# Patient Record
Sex: MALE | Race: WHITE | Employment: OTHER | URBAN - METROPOLITAN AREA
[De-identification: names, ages, dates, MRNs, and addresses within clinical notes are randomized per-mention and may not be internally consistent; named-entity substitution may affect disease eponyms.]

---

## 2017-01-06 PROBLEM — R01.1 MURMUR: Status: ACTIVE | Noted: 2017-01-06

## 2017-01-06 PROBLEM — R94.31 ABNORMAL EKG: Status: ACTIVE | Noted: 2017-01-06

## 2017-01-23 RX ORDER — VERAPAMIL HYDROCHLORIDE 240 MG/1
CAPSULE, EXTENDED RELEASE ORAL
Qty: 90 CAPSULE | Refills: 0 | Status: SHIPPED | OUTPATIENT
Start: 2017-01-23 | End: 2017-01-26

## 2017-01-24 ENCOUNTER — HOSPITAL ENCOUNTER (OUTPATIENT)
Dept: CV DIAGNOSTICS | Age: 82
Discharge: HOME | End: 2017-01-24
Attending: INTERNAL MEDICINE
Payer: MEDICARE

## 2017-01-24 DIAGNOSIS — R94.31 ABNORMAL EKG: ICD-10-CM

## 2017-01-24 DIAGNOSIS — R01.1 MURMUR: ICD-10-CM

## 2017-01-24 PROCEDURE — 93306 TTE W/DOPPLER COMPLETE: CPT | Performed by: INTERNAL MEDICINE

## 2017-01-25 ENCOUNTER — PATIENT MESSAGE (OUTPATIENT)
Dept: INTERNAL MEDICINE CLINIC | Facility: CLINIC | Age: 82
End: 2017-01-25

## 2017-01-26 ENCOUNTER — HOSPITAL ENCOUNTER (OUTPATIENT)
Dept: CV DIAGNOSTICS | Facility: HOSPITAL | Age: 82
Discharge: HOME | End: 2017-01-26
Attending: INTERNAL MEDICINE
Payer: MEDICARE

## 2017-01-26 DIAGNOSIS — R94.31 ABNORMAL EKG: ICD-10-CM

## 2017-01-26 PROCEDURE — 93017 CV STRESS TEST TRACING ONLY: CPT

## 2017-01-26 PROCEDURE — 78452 HT MUSCLE IMAGE SPECT MULT: CPT | Performed by: INTERNAL MEDICINE

## 2017-01-26 PROCEDURE — 93018 CV STRESS TEST I&R ONLY: CPT | Performed by: INTERNAL MEDICINE

## 2017-01-26 PROCEDURE — 78452 HT MUSCLE IMAGE SPECT MULT: CPT

## 2017-01-26 RX ORDER — VERAPAMIL HYDROCHLORIDE 240 MG/1
TABLET, FILM COATED, EXTENDED RELEASE ORAL
Qty: 90 TABLET | Refills: 1 | Status: SHIPPED | OUTPATIENT
Start: 2017-01-26 | End: 2017-07-07

## 2017-01-26 NOTE — TELEPHONE ENCOUNTER
Noted below. Pt asking if Verapamil  mg SR could be changed to Verapamil  mg ER, due to insurance requirements. Please advise.

## 2017-01-26 NOTE — TELEPHONE ENCOUNTER
From: Sol Julian  To: Emmett Berrios DO  Sent: 1/25/2017 10:35 PM CST  Subject: Prescription Question    My insurance company will not cover the Vearapamil 240MgSr caps on my prescription.  I called the insurance company and the said they WILL cover\" [place]

## 2017-01-27 DIAGNOSIS — E78.00 HYPERCHOLESTEROLEMIA: ICD-10-CM

## 2017-01-27 DIAGNOSIS — I10 ESSENTIAL HYPERTENSION: Primary | ICD-10-CM

## 2017-01-27 RX ORDER — PRAVASTATIN SODIUM 40 MG
40 TABLET ORAL NIGHTLY
Qty: 90 TABLET | Refills: 0 | Status: SHIPPED | OUTPATIENT
Start: 2017-01-27 | End: 2017-02-07

## 2017-01-27 NOTE — TELEPHONE ENCOUNTER
Last OV pertinent to medication: 11/9/16 for BP check   Last refill date: 10/20/16     #/refills: #90 + 0  When pt was asked to return for OV: 6 months  Upcoming appt/reason: No future appt      Lab Results  Component Value Date   * 07/02/2016   BUN

## 2017-01-27 NOTE — TELEPHONE ENCOUNTER
Pt due for repeat labs, please have him complete cmp and lipd panel; please order under Dr Fitz García as provider; med refill approved

## 2017-01-27 NOTE — TELEPHONE ENCOUNTER
Was patient advised to contact pharmacy directly?:  Yes - pharmacy said they haven't heard from us yet    Is patient out of meds or supply very low?:  4 left    Medication Requested:  PRAVASTATIN SODIUM Oral Tab    Dose:  40 MG    Is patient requesting a 3

## 2017-01-27 NOTE — TELEPHONE ENCOUNTER
Called and left message on home number informing pt that he is due to repeat Lipid and CMP.   Reminded to FAST 10-12 hours and orders are on file at [address].

## 2017-02-01 LAB
ALBUMIN/GLOBULIN RATIO: 1.4 (CALC) (ref 1–2.5)
ALBUMIN: 4.2 G/DL (ref 3.6–5.1)
ALKALINE PHOSPHATASE: 75 U/L (ref 40–115)
ALT: 9 U/L (ref 9–46)
AST: 12 U/L (ref 10–35)
BILIRUBIN, TOTAL: 0.6 MG/DL (ref 0.2–1.2)
BUN/CREATININE RATIO: 19 (CALC) (ref 6–22)
BUN: 24 MG/DL (ref 7–25)
CALCIUM: 9.3 MG/DL (ref 8.6–10.3)
CARBON DIOXIDE: 26 MMOL/L (ref 20–31)
CHLORIDE: 104 MMOL/L (ref 98–110)
CREATININE: 1.29 MG/DL (ref 0.7–1.11)
EGFR IF AFRICN AM: 57 ML/MIN/1.73M2
EGFR IF NONAFRICN AM: 49 ML/MIN/1.73M2
GLOBULIN: 2.9 G/DL (CALC) (ref 1.9–3.7)
GLUCOSE: 108 MG/DL (ref 65–99)
POTASSIUM: 4.7 MMOL/L (ref 3.5–5.3)
PROTEIN, TOTAL: 7.1 G/DL (ref 6.1–8.1)
SODIUM: 139 MMOL/L (ref 135–146)

## 2017-02-07 PROBLEM — I35.1 AORTIC INSUFFICIENCY: Status: ACTIVE | Noted: 2017-02-07

## 2017-02-07 PROBLEM — I25.10 CAD (CORONARY ARTERY DISEASE): Status: ACTIVE | Noted: 2017-02-07

## 2017-04-19 RX ORDER — PAROXETINE 30 MG/1
TABLET, FILM COATED ORAL
Qty: 90 TABLET | Refills: 0 | Status: SHIPPED | OUTPATIENT
Start: 2017-04-19 | End: 2017-07-14

## 2017-04-19 NOTE — TELEPHONE ENCOUNTER
Pt scheduled appt for BP check 5/1/17.   Last OV pertinent to medication: 11/1/16  Last refill date: 1/22/17     #/refills: #90/0  When pt was asked to return for OV: N/A  Upcoming appt/reason:BP check 5/1/17    Lab Results  Component Value Date   *

## 2017-04-28 ENCOUNTER — PATIENT MESSAGE (OUTPATIENT)
Dept: INTERNAL MEDICINE CLINIC | Facility: CLINIC | Age: 82
End: 2017-04-28

## 2017-04-28 NOTE — TELEPHONE ENCOUNTER
From: Charmayne Clock  To: Eduarda Lilly DO  Sent: 4/28/2017 1:07 PM CDT  Subject: Test Results Question    About a month ago Dr Jamee Colin asked me to take a lipids test because she changed my cholesterol drug. Are you aware of    this?  I have not seen the resul

## 2017-05-01 ENCOUNTER — OFFICE VISIT (OUTPATIENT)
Dept: INTERNAL MEDICINE CLINIC | Facility: CLINIC | Age: 82
End: 2017-05-01

## 2017-05-01 VITALS
BODY MASS INDEX: 28.64 KG/M2 | SYSTOLIC BLOOD PRESSURE: 120 MMHG | HEART RATE: 76 BPM | RESPIRATION RATE: 12 BRPM | DIASTOLIC BLOOD PRESSURE: 70 MMHG | WEIGHT: 189 LBS | OXYGEN SATURATION: 98 % | HEIGHT: 68 IN

## 2017-05-01 DIAGNOSIS — Z12.11 COLON CANCER SCREENING: ICD-10-CM

## 2017-05-01 DIAGNOSIS — I10 ESSENTIAL HYPERTENSION: ICD-10-CM

## 2017-05-01 DIAGNOSIS — H91.93 BILATERAL HEARING LOSS, UNSPECIFIED HEARING LOSS TYPE: ICD-10-CM

## 2017-05-01 DIAGNOSIS — Z00.00 ENCOUNTER FOR ANNUAL HEALTH EXAMINATION: Primary | ICD-10-CM

## 2017-05-01 DIAGNOSIS — F41.9 ANXIETY: ICD-10-CM

## 2017-05-01 DIAGNOSIS — H61.23 BILATERAL IMPACTED CERUMEN: ICD-10-CM

## 2017-05-01 PROCEDURE — G0439 PPPS, SUBSEQ VISIT: HCPCS | Performed by: INTERNAL MEDICINE

## 2017-05-01 PROCEDURE — 99214 OFFICE O/P EST MOD 30 MIN: CPT | Performed by: INTERNAL MEDICINE

## 2017-05-01 NOTE — PATIENT INSTRUCTIONS
[facility] SCH[facility]   Tests on this list are recommended by your physician but may not be covered, or covered at this frequency, by your insurer. Please check with your insurance carrier before scheduling to verify coverage.     Curtis Santiago Colonoscopy Screen   Covered every 10 years- more often if abnormal Colonoscopy,10 Years due on 02/23/2017 Update Health Maintenance if applicable    Flex Sigmoidoscopy Screen  Covered every 5 years No results found for this or any previous visit.  No fl for this or any previous visit. This may be covered with your prescription benefits, but Medicare does not cover unless Medically needed    Zoster (Not covered by Medicare Part B) No orders found for this or any previous visit.  at [address] [place] [address]

## 2017-05-01 NOTE — PROGRESS NOTES
HPI:   Felicitas Soria is a 80year old male who presents for a Medicare Subsequent Annual Wellness visit (Pt already had Initial Annual Wellness).   Pt here for Bp check  He is seeing Dr Margaret Vasquez cardiology now  He wants to know should he stay on paxil 30 Internal hemorrhoids (2/23/2007); Tubular adenoma; Closed angle glaucoma; Colon polyps (2/23/2007); Atypical chest pain; Olecranon bursitis; Anxiety (12/12/2011); Nasal polyps; Arthritis; Migraine headache with aura; Hyperglycemia; Benign mole;  Allergic rh H1N1 01/18/2010   • Pneumococcal (Prevnar 13) 05/10/2016       ASSESSMENT AND OTHER RELEVANT CHRONIC CONDITIONS:   Christin Ramirez is a 80year old male who presents for a Medicare Assessment.    Single  Workout daily at the gym  Likes to watch TV  PLAN JACOME had any immunizations at another office such as Influenza, Hepatitis B, Tetanus, or Pneumococcal?: No     Functional Ability     Bathing or Showering: Able without help    Toileting: Able without help    Dressing: Able without help    Eating: Able without External Lab or Procedure   Diabetes Screening      HbgA1C   Annually HEMOGLOBIN A1C (% of total Hgb)   Date Value   07/24/2013 5.6       No flowsheet data found.     Fasting Blood Sugar (FSB)Annually   GLUCOSE (mg/dL)   Date Value   01/31/2017 108*   ----- Conc  Annually No results found for: DIGOXIN, DIG, VALP No flowsheet data found. Diabetes      HgbA1C  Annually HEMOGLOBIN A1C (% of total Hgb)   Date Value   07/24/2013 5.6       No flowsheet data found.     Creat/alb ratio  Annually      LDL  Annually

## 2017-05-17 ENCOUNTER — LAB ENCOUNTER (OUTPATIENT)
Dept: LAB | Facility: HOSPITAL | Age: 82
End: 2017-05-17
Attending: INTERNAL MEDICINE
Payer: MEDICARE

## 2017-05-17 DIAGNOSIS — Z00.00 ROUTINE GENERAL MEDICAL EXAMINATION AT A HEALTH CARE FACILITY: Primary | ICD-10-CM

## 2017-05-26 PROCEDURE — 82272 OCCULT BLD FECES 1-3 TESTS: CPT

## 2017-07-10 RX ORDER — VERAPAMIL HYDROCHLORIDE 240 MG/1
TABLET, FILM COATED, EXTENDED RELEASE ORAL
Qty: 90 TABLET | Refills: 0 | Status: SHIPPED | OUTPATIENT
Start: 2017-07-10 | End: 2017-10-07

## 2017-07-14 RX ORDER — PAROXETINE 30 MG/1
30 TABLET, FILM COATED ORAL
Qty: 90 TABLET | Refills: 0 | Status: SHIPPED | OUTPATIENT
Start: 2017-07-14 | End: 2017-08-16 | Stop reason: DRUGHIGH

## 2017-07-14 NOTE — TELEPHONE ENCOUNTER
Last OV pertinent to medication: 5/1/17 awv  Last refill date: 4/19/17     #/refills: 90/0  When pt was asked to return for OV: none noted  Upcoming appt/reason: none

## 2017-08-16 PROBLEM — N18.30 CKD (CHRONIC KIDNEY DISEASE) STAGE 3, GFR 30-59 ML/MIN (HCC): Status: ACTIVE | Noted: 2017-08-16

## 2017-08-20 PROBLEM — Z71.89 ADVANCE DIRECTIVE DISCUSSED WITH PATIENT: Status: ACTIVE | Noted: 2017-08-20

## 2017-10-06 RX ORDER — VERAPAMIL HYDROCHLORIDE 240 MG/1
TABLET, FILM COATED, EXTENDED RELEASE ORAL
Qty: 90 TABLET | Refills: 0 | Status: CANCELLED | OUTPATIENT
Start: 2017-10-06

## 2018-01-25 PROCEDURE — 81003 URINALYSIS AUTO W/O SCOPE: CPT | Performed by: FAMILY MEDICINE

## 2018-01-26 PROBLEM — R79.89 BLOOD CREATININE INCREASED COMPARED WITH PRIOR MEASUREMENT: Status: ACTIVE | Noted: 2018-01-26

## 2019-01-23 PROBLEM — R73.09 ELEVATED HEMOGLOBIN A1C: Status: ACTIVE | Noted: 2019-01-23

## 2019-05-23 PROBLEM — Z87.2 HISTORY OF ACTINIC KERATOSES: Status: ACTIVE | Noted: 2019-05-23

## 2020-02-28 PROBLEM — K86.2 PANCREATIC CYST: Status: ACTIVE | Noted: 2020-02-20

## 2020-02-28 PROBLEM — K86.2 PANCREATIC CYST (HCC): Status: ACTIVE | Noted: 2020-02-20

## 2020-03-08 PROBLEM — H35.3131 EARLY DRY STAGE NONEXUDATIVE AGE-RELATED MACULAR DEGENERATION OF BOTH EYES: Status: ACTIVE | Noted: 2019-04-15

## 2020-03-08 PROBLEM — H40.1131 PRIMARY OPEN-ANGLE GLAUCOMA, BILATERAL, MILD STAGE: Status: ACTIVE | Noted: 2019-04-15

## 2020-05-29 PROBLEM — N18.30 ANEMIA DUE TO STAGE 3 CHRONIC KIDNEY DISEASE  (HCC): Status: ACTIVE | Noted: 2020-05-29

## 2020-05-29 PROBLEM — D63.1 ANEMIA DUE TO STAGE 3 CHRONIC KIDNEY DISEASE: Status: ACTIVE | Noted: 2020-05-29

## 2020-05-29 PROBLEM — D63.1 ANEMIA DUE TO STAGE 3 CHRONIC KIDNEY DISEASE  (HCC): Status: ACTIVE | Noted: 2020-05-29

## 2020-05-29 PROBLEM — N18.30 ANEMIA DUE TO STAGE 3 CHRONIC KIDNEY DISEASE (HCC): Status: ACTIVE | Noted: 2020-05-29

## 2020-05-29 PROBLEM — D63.1 ANEMIA DUE TO STAGE 3 CHRONIC KIDNEY DISEASE (HCC): Status: ACTIVE | Noted: 2020-05-29

## 2020-05-29 PROBLEM — N18.30 ANEMIA DUE TO STAGE 3 CHRONIC KIDNEY DISEASE: Status: ACTIVE | Noted: 2020-05-29

## 2020-06-13 ENCOUNTER — LAB ENCOUNTER (OUTPATIENT)
Dept: LAB | Facility: HOSPITAL | Age: 85
DRG: 269 | End: 2020-06-13
Attending: SURGERY
Payer: MEDICARE

## 2020-06-13 ENCOUNTER — HOSPITAL ENCOUNTER (OUTPATIENT)
Dept: CV DIAGNOSTICS | Facility: HOSPITAL | Age: 85
Discharge: HOME | DRG: 269 | End: 2020-06-13
Attending: SURGERY
Payer: MEDICARE

## 2020-06-13 ENCOUNTER — HOSPITAL ENCOUNTER (OUTPATIENT)
Dept: LAB | Facility: HOSPITAL | Age: 85
Discharge: HOME | DRG: 269 | End: 2020-06-13
Attending: SURGERY
Payer: MEDICARE

## 2020-06-13 DIAGNOSIS — Z91.89 AT RISK FOR INFECTION: ICD-10-CM

## 2020-06-13 DIAGNOSIS — I71.4 ABDOMINAL ANEURYSM (HCC): ICD-10-CM

## 2020-06-13 DIAGNOSIS — Z01.818 PRE-OP TESTING: ICD-10-CM

## 2020-06-13 DIAGNOSIS — Z91.89 AT RISK FOR BLEEDING: ICD-10-CM

## 2020-06-13 PROCEDURE — 93010 ELECTROCARDIOGRAM REPORT: CPT | Performed by: INTERNAL MEDICINE

## 2020-06-13 PROCEDURE — 86850 RBC ANTIBODY SCREEN: CPT | Performed by: SURGERY

## 2020-06-13 PROCEDURE — 86901 BLOOD TYPING SEROLOGIC RH(D): CPT | Performed by: SURGERY

## 2020-06-13 PROCEDURE — 93005 ELECTROCARDIOGRAM TRACING: CPT

## 2020-06-13 PROCEDURE — 86900 BLOOD TYPING SEROLOGIC ABO: CPT | Performed by: SURGERY

## 2020-06-15 ENCOUNTER — ANESTHESIA (OUTPATIENT)
Dept: CARDIAC SURGERY | Facility: HOSPITAL | Age: 85
DRG: 269 | End: 2020-06-15
Payer: MEDICARE

## 2020-06-15 ENCOUNTER — HOSPITAL ENCOUNTER (INPATIENT)
Facility: HOSPITAL | Age: 85
LOS: 1 days | Discharge: HOME HEALTH | DRG: 269 | End: 2020-06-16
Attending: SURGERY | Admitting: SURGERY
Payer: MEDICARE

## 2020-06-15 ENCOUNTER — ANESTHESIA EVENT (OUTPATIENT)
Dept: CARDIAC SURGERY | Facility: HOSPITAL | Age: 85
DRG: 269 | End: 2020-06-15
Payer: MEDICARE

## 2020-06-15 DIAGNOSIS — Z01.818 PRE-OP TESTING: ICD-10-CM

## 2020-06-15 DIAGNOSIS — I71.4 ABDOMINAL ANEURYSM (HCC): Primary | ICD-10-CM

## 2020-06-15 DIAGNOSIS — Z91.89 AT RISK FOR INFECTION: ICD-10-CM

## 2020-06-15 DIAGNOSIS — Z91.89 AT RISK FOR BLEEDING: ICD-10-CM

## 2020-06-15 PROBLEM — I71.40 AAA (ABDOMINAL AORTIC ANEURYSM) WITHOUT RUPTURE: Status: ACTIVE | Noted: 2020-06-15

## 2020-06-15 PROBLEM — I71.40 AAA (ABDOMINAL AORTIC ANEURYSM) WITHOUT RUPTURE (HCC): Status: ACTIVE | Noted: 2020-06-15

## 2020-06-15 PROCEDURE — 85014 HEMATOCRIT: CPT

## 2020-06-15 PROCEDURE — 86920 COMPATIBILITY TEST SPIN: CPT

## 2020-06-15 PROCEDURE — B410YZZ FLUOROSCOPY OF ABDOMINAL AORTA USING OTHER CONTRAST: ICD-10-PCS | Performed by: SURGERY

## 2020-06-15 PROCEDURE — 04V03DZ RESTRICTION OF ABDOMINAL AORTA WITH INTRALUMINAL DEVICE, PERCUTANEOUS APPROACH: ICD-10-PCS | Performed by: SURGERY

## 2020-06-15 PROCEDURE — 85347 COAGULATION TIME ACTIVATED: CPT

## 2020-06-15 PROCEDURE — 93005 ELECTROCARDIOGRAM TRACING: CPT

## 2020-06-15 PROCEDURE — 93010 ELECTROCARDIOGRAM REPORT: CPT | Performed by: INTERNAL MEDICINE

## 2020-06-15 PROCEDURE — 84295 ASSAY OF SERUM SODIUM: CPT

## 2020-06-15 PROCEDURE — 82803 BLOOD GASES ANY COMBINATION: CPT

## 2020-06-15 PROCEDURE — 85610 PROTHROMBIN TIME: CPT | Performed by: SURGERY

## 2020-06-15 PROCEDURE — 85730 THROMBOPLASTIN TIME PARTIAL: CPT | Performed by: SURGERY

## 2020-06-15 PROCEDURE — 82330 ASSAY OF CALCIUM: CPT

## 2020-06-15 PROCEDURE — 84132 ASSAY OF SERUM POTASSIUM: CPT

## 2020-06-15 RX ORDER — DEXMEDETOMIDINE HYDROCHLORIDE 4 UG/ML
INJECTION, SOLUTION INTRAVENOUS CONTINUOUS PRN
Status: DISCONTINUED | OUTPATIENT
Start: 2020-06-15 | End: 2020-06-16

## 2020-06-15 RX ORDER — ASPIRIN 325 MG
325 TABLET ORAL DAILY
Status: DISCONTINUED | OUTPATIENT
Start: 2020-06-15 | End: 2020-06-16

## 2020-06-15 RX ORDER — FAMOTIDINE 10 MG/ML
20 INJECTION, SOLUTION INTRAVENOUS DAILY
Status: DISCONTINUED | OUTPATIENT
Start: 2020-06-15 | End: 2020-06-16

## 2020-06-15 RX ORDER — PHENYLEPHRINE HCL IN 0.9% NACL 50MG/250ML
PLASTIC BAG, INJECTION (ML) INTRAVENOUS CONTINUOUS
Status: DISCONTINUED | OUTPATIENT
Start: 2020-06-15 | End: 2020-06-16

## 2020-06-15 RX ORDER — FAMOTIDINE 20 MG/1
20 TABLET ORAL DAILY
Status: DISCONTINUED | OUTPATIENT
Start: 2020-06-15 | End: 2020-06-16

## 2020-06-15 RX ORDER — ONDANSETRON 2 MG/ML
4 INJECTION INTRAMUSCULAR; INTRAVENOUS AS NEEDED
Status: ACTIVE | OUTPATIENT
Start: 2020-06-15 | End: 2020-06-15

## 2020-06-15 RX ORDER — NITROGLYCERIN 20 MG/100ML
INJECTION INTRAVENOUS CONTINUOUS
Status: DISCONTINUED | OUTPATIENT
Start: 2020-06-15 | End: 2020-06-16

## 2020-06-15 RX ORDER — NALOXONE HYDROCHLORIDE 0.4 MG/ML
80 INJECTION, SOLUTION INTRAMUSCULAR; INTRAVENOUS; SUBCUTANEOUS AS NEEDED
Status: ACTIVE | OUTPATIENT
Start: 2020-06-15 | End: 2020-06-15

## 2020-06-15 RX ORDER — NITROGLYCERIN 20 MG/100ML
INJECTION INTRAVENOUS CONTINUOUS PRN
Status: DISCONTINUED | OUTPATIENT
Start: 2020-06-15 | End: 2020-06-15 | Stop reason: SURG

## 2020-06-15 RX ORDER — HEPARIN SODIUM 1000 [USP'U]/ML
INJECTION, SOLUTION INTRAVENOUS; SUBCUTANEOUS AS NEEDED
Status: DISCONTINUED | OUTPATIENT
Start: 2020-06-15 | End: 2020-06-15 | Stop reason: SURG

## 2020-06-15 RX ORDER — SODIUM CHLORIDE 9 MG/ML
INJECTION, SOLUTION INTRAVENOUS CONTINUOUS PRN
Status: DISCONTINUED | OUTPATIENT
Start: 2020-06-15 | End: 2020-06-15 | Stop reason: SURG

## 2020-06-15 RX ORDER — ROCURONIUM BROMIDE 10 MG/ML
INJECTION, SOLUTION INTRAVENOUS AS NEEDED
Status: DISCONTINUED | OUTPATIENT
Start: 2020-06-15 | End: 2020-06-15 | Stop reason: SURG

## 2020-06-15 RX ORDER — SODIUM CHLORIDE 9 MG/ML
INJECTION, SOLUTION INTRAVENOUS CONTINUOUS
Status: DISCONTINUED | OUTPATIENT
Start: 2020-06-15 | End: 2020-06-16

## 2020-06-15 RX ORDER — ASPIRIN 81 MG/1
81 TABLET ORAL DAILY
Status: DISCONTINUED | OUTPATIENT
Start: 2020-06-15 | End: 2020-06-15

## 2020-06-15 RX ORDER — ATORVASTATIN CALCIUM 40 MG/1
40 TABLET, FILM COATED ORAL NIGHTLY
Status: DISCONTINUED | OUTPATIENT
Start: 2020-06-15 | End: 2020-06-16

## 2020-06-15 RX ORDER — BUPIVACAINE HYDROCHLORIDE 5 MG/ML
INJECTION, SOLUTION EPIDURAL; INTRACAUDAL AS NEEDED
Status: DISCONTINUED | OUTPATIENT
Start: 2020-06-15 | End: 2020-06-15 | Stop reason: HOSPADM

## 2020-06-15 RX ORDER — CEFAZOLIN SODIUM/WATER 2 G/20 ML
2 SYRINGE (ML) INTRAVENOUS EVERY 8 HOURS
Status: DISCONTINUED | OUTPATIENT
Start: 2020-06-15 | End: 2020-06-15

## 2020-06-15 RX ORDER — PAROXETINE HYDROCHLORIDE 20 MG/1
40 TABLET, FILM COATED ORAL DAILY
Status: DISCONTINUED | OUTPATIENT
Start: 2020-06-15 | End: 2020-06-16

## 2020-06-15 RX ORDER — ESMOLOL HYDROCHLORIDE 10 MG/ML
INJECTION INTRAVENOUS AS NEEDED
Status: DISCONTINUED | OUTPATIENT
Start: 2020-06-15 | End: 2020-06-15 | Stop reason: SURG

## 2020-06-15 RX ORDER — PROTAMINE SULFATE 10 MG/ML
INJECTION, SOLUTION INTRAVENOUS AS NEEDED
Status: DISCONTINUED | OUTPATIENT
Start: 2020-06-15 | End: 2020-06-15 | Stop reason: SURG

## 2020-06-15 RX ORDER — LATANOPROST 50 UG/ML
1 SOLUTION/ DROPS OPHTHALMIC DAILY
Status: DISCONTINUED | OUTPATIENT
Start: 2020-06-15 | End: 2020-06-16

## 2020-06-15 RX ORDER — LIDOCAINE HYDROCHLORIDE 10 MG/ML
INJECTION, SOLUTION EPIDURAL; INFILTRATION; INTRACAUDAL; PERINEURAL AS NEEDED
Status: DISCONTINUED | OUTPATIENT
Start: 2020-06-15 | End: 2020-06-15 | Stop reason: SURG

## 2020-06-15 RX ORDER — VERAPAMIL HYDROCHLORIDE 240 MG/1
240 TABLET, FILM COATED, EXTENDED RELEASE ORAL DAILY
Status: DISCONTINUED | OUTPATIENT
Start: 2020-06-15 | End: 2020-06-16

## 2020-06-15 RX ORDER — FLUTICASONE PROPIONATE 50 MCG
2 SPRAY, SUSPENSION (ML) NASAL DAILY
Status: DISCONTINUED | OUTPATIENT
Start: 2020-06-15 | End: 2020-06-16

## 2020-06-15 RX ORDER — ENOXAPARIN SODIUM 100 MG/ML
40 INJECTION SUBCUTANEOUS DAILY
Status: DISCONTINUED | OUTPATIENT
Start: 2020-06-16 | End: 2020-06-16

## 2020-06-15 RX ORDER — ONDANSETRON 2 MG/ML
4 INJECTION INTRAMUSCULAR; INTRAVENOUS EVERY 6 HOURS PRN
Status: DISCONTINUED | OUTPATIENT
Start: 2020-06-15 | End: 2020-06-16

## 2020-06-15 RX ORDER — SODIUM CHLORIDE, SODIUM LACTATE, POTASSIUM CHLORIDE, CALCIUM CHLORIDE 600; 310; 30; 20 MG/100ML; MG/100ML; MG/100ML; MG/100ML
INJECTION, SOLUTION INTRAVENOUS CONTINUOUS
Status: DISCONTINUED | OUTPATIENT
Start: 2020-06-15 | End: 2020-06-16

## 2020-06-15 RX ADMIN — NITROGLYCERIN 50 MCG/MIN: 20 INJECTION INTRAVENOUS at 09:10:00

## 2020-06-15 RX ADMIN — SODIUM CHLORIDE: 9 INJECTION, SOLUTION INTRAVENOUS at 06:53:00

## 2020-06-15 RX ADMIN — LIDOCAINE HYDROCHLORIDE 70 MG: 10 INJECTION, SOLUTION EPIDURAL; INFILTRATION; INTRACAUDAL; PERINEURAL at 07:11:00

## 2020-06-15 RX ADMIN — ESMOLOL HYDROCHLORIDE 20 MG: 10 INJECTION INTRAVENOUS at 08:48:00

## 2020-06-15 RX ADMIN — ROCURONIUM BROMIDE 50 MG: 10 INJECTION, SOLUTION INTRAVENOUS at 07:11:00

## 2020-06-15 RX ADMIN — PROTAMINE SULFATE 10 MG: 10 INJECTION, SOLUTION INTRAVENOUS at 08:35:00

## 2020-06-15 RX ADMIN — HEPARIN SODIUM 6000 UNITS: 1000 INJECTION, SOLUTION INTRAVENOUS; SUBCUTANEOUS at 07:54:00

## 2020-06-15 RX ADMIN — NITROGLYCERIN 25 MCG/MIN: 20 INJECTION INTRAVENOUS at 09:07:00

## 2020-06-15 RX ADMIN — NITROGLYCERIN 15 MCG/MIN: 20 INJECTION INTRAVENOUS at 08:52:00

## 2020-06-15 RX ADMIN — SODIUM CHLORIDE: 9 INJECTION, SOLUTION INTRAVENOUS at 08:39:00

## 2020-06-15 RX ADMIN — PROTAMINE SULFATE 40 MG: 10 INJECTION, SOLUTION INTRAVENOUS at 08:38:00

## 2020-06-15 RX ADMIN — SODIUM CHLORIDE: 9 INJECTION, SOLUTION INTRAVENOUS at 07:37:00

## 2020-06-15 NOTE — PLAN OF CARE
Pt received from the CVOR this am about 0910. Pt received exutbated. Pt with NTG gtt infusing. titrating NTG for sbp . Radial arterial line and fowler catheter in place.   Bilateral groin sites soft and non-tender with strong palpable pedal pulses pr

## 2020-06-15 NOTE — H&P
[facility]  Vascular Surgery Consultation    [facility] Patient Status:  Inpatient    10/24/1928 MRN [id number]   Location [address] [place] [address] PRE OP Attending Yves Steel MD   Hosp Day # 0 PCP Erika More MD         Histo He reports that he does not use drugs. Allergies:    Pcn [Penicillins]       RASH    Medications:  No current facility-administered medications for this encounter.      Review of Systems:    CONSTITUTIONAL: denies fever, chills  ENT: denies sore throat, achieving goals; and the potential problems that might occur during recuperation. I discussed reasonable alternatives to the procedure, including risks, benefits, and side effects related to the alternatives and risks related to not having this procedure.

## 2020-06-15 NOTE — ANESTHESIA PROCEDURE NOTES
Peripheral IV  Date/Time: 6/15/2020 7:16 AM  Inserted by: Didi Samayoa MD    Placement  Needle size: 18 G  Laterality: left  Location: hand  Local anesthetic: none  Site prep: alcohol  Technique: anatomical landmarks  Attempts: 1

## 2020-06-15 NOTE — BRIEF OP NOTE
Pre-Operative Diagnosis: abdominal aortic aneurysm without rupture     Post-Operative Diagnosis: abdominal aortic aneurysm without rupture      Procedure Performed:   1. US perc access left and right common femoral artery   2. Aortogram   3.  Endovascular a

## 2020-06-15 NOTE — ANESTHESIA POSTPROCEDURE EVALUATION
[address] Patient Status:  Inpatient   Age/Gender 80year old male MRN [id number]   [facility] 6NE-A Attending Abdullahi Avila MD   Hosp Day # 0 PCP Shakira Landis MD       Anesthesia Post-op Note    Procedure(s):  Ab

## 2020-06-15 NOTE — ANESTHESIA PROCEDURE NOTES
Arterial Line  Performed by: Santi Brar MD  Authorized by: Santi Brar MD     General Information and Staff    Procedure Start:  6/15/2020 7:01 AM  Procedure End:  6/15/2020 7:11 AM  Anesthesiologist:  Santi Brar MD  Performed By:  Beverly Can

## 2020-06-15 NOTE — ANESTHESIA PREPROCEDURE EVALUATION
PRE-OP EVALUATION    Patient Name: Scott Sidhu    Pre-op Diagnosis: abdominal aortic aneurysm without rupture    Procedure(s):  Abdominal aortic aneurysm  [facility]    Surgeon(s) and Role:     Jessee Poon MD - Primary    Pre-op vitals reviewed. rhinitis  Hypercholesterolemia Abdominal aneurysm (HCC)  Urine incontinence Abnormal EKG  Murmur Aortic insufficiency  CAD (coronary artery disease) CKD (chronic kidney disease) stage 3, GFR 30-59 ml/min ([address]  Advance directive discussed with patient Alma Rosa the basal, mid, and apical inferior wall(s), consistent  with infarction.  There is a small, mild, fixed defect involving the apical  lateral wall(s), likely due to apical thinning artifact.     -------------------------------------------------------------- valve.    Doppler: There is no  evidence for stenosis. There is no regurgitation. Pulmonic valve:   Not well visualized. Doppler: There is trace  regurgitation. Pericardium:  There is no significant pericardial effusion. Aorta:   Aortic root: The a Plan: general  NPO status verified and patient meets guidelines. Comment: Options, risks and benefits of anesthesia as outlined in the anesthesia consent were reviewed with the patient.  Risks and benefits of GA including sore throat, allergy, naus

## 2020-06-15 NOTE — ANESTHESIA PROCEDURE NOTES
Airway  Date/Time: 6/15/2020 7:14 AM  Urgency: elective    Airway not difficult    General Information and Staff    Patient location during procedure: OR  Anesthesiologist: Christiano Trujillo MD  Performed: anesthesiologist     Indications and Patient Conditio

## 2020-06-16 VITALS
DIASTOLIC BLOOD PRESSURE: 55 MMHG | OXYGEN SATURATION: 97 % | HEIGHT: 67 IN | SYSTOLIC BLOOD PRESSURE: 137 MMHG | TEMPERATURE: 98 F | RESPIRATION RATE: 19 BRPM | WEIGHT: 166.69 LBS | BODY MASS INDEX: 26.16 KG/M2 | HEART RATE: 63 BPM

## 2020-06-16 PROCEDURE — 97535 SELF CARE MNGMENT TRAINING: CPT

## 2020-06-16 PROCEDURE — 97162 PT EVAL MOD COMPLEX 30 MIN: CPT

## 2020-06-16 PROCEDURE — 97530 THERAPEUTIC ACTIVITIES: CPT

## 2020-06-16 PROCEDURE — 80048 BASIC METABOLIC PNL TOTAL CA: CPT | Performed by: SURGERY

## 2020-06-16 PROCEDURE — 97166 OT EVAL MOD COMPLEX 45 MIN: CPT

## 2020-06-16 PROCEDURE — 97116 GAIT TRAINING THERAPY: CPT

## 2020-06-16 NOTE — CM/SW NOTE
Abby Morales, 06/16/20, 11:04 AM  Met with patient's dtr, Lakisha to discuss discharge needs. Lakisha indicates patient (her father) lives in a tri-level home with his wife (her mother).   Both are fairly independent with adls, but Lemuel Torres has noticed a decli

## 2020-06-16 NOTE — PLAN OF CARE
Assumed care of Jarred Mills @ approximately 3686: awake, alert, confused, agitated, oriented to self only, and uncooperative with care; daughter called by day-shift RN and myself at shift change to speak with Jarred Mills in attempts to reorient him - with no success.   Verbalizes/displays adequate comfort level or patient's stated pain goal  Description  INTERVENTIONS:  - Encourage pt to monitor pain and request assistance  - Assess pain using appropriate pain scale  - Administer analgesics based on type and severity of

## 2020-06-16 NOTE — HOME CARE LIAISON
Received referral from [address] [place] [address] [address] Met with patient at the bedside to discuss home health services and offer choice. Patient is agreeable to [facility] services at discharge.  Family is aware that they will need PCP visit before [place] can start, daughter Lemuel Torres will

## 2020-06-16 NOTE — CONSULTS
General Medicine Consult      Consulted by: No att. providers found    PCP: Manfred Ricks MD    Reason for Consultation: Medical Management    History of Present Illness: Patient is a 80year old male with PMH including but not limited to abd aneursym, dep use: Yes      Alcohol/week: 0.0 - 0.8 standard drinks      Comment: 1 drink 2-3 times per week       Fam Hx  Family History   Problem Relation Age of Onset   • Hypertension Mother    • Diabetes Mother    • Pacemaker Mother    • Heart Disorder Mother and agreeing with therapeutic plan as outlined. D/w RN Xiomara Faust       Thank you for allowing me to participate in the care of this patient.      Nabor Vinson MD  [facility] Hospitalist  Pager [phone]    6/16/2020  3:38 PM

## 2020-06-16 NOTE — PLAN OF CARE
Pt has been hemodynamically stable. precedex gtt off upon change of shift. NTG stopped. bp controlled with oral medication. Pt remains confused but improved and is pleasant and cooperative. Bilateral groin sites WNL with palpable pedal pulses present.

## 2020-06-16 NOTE — PHYSICAL THERAPY NOTE
PHYSICAL THERAPY EVALUATION - INPATIENT     Room Number: 3150/7232-G  Evaluation Date: 6/16/2020  Type of Evaluation: Initial  Physician Order: PT Eval and Treat    Presenting Problem: Abdominal aortic aneurysm  Reason for Therapy: Mobility Dysfunction a HOME SITUATION  Type of Home: House   Home Layout: Multi-level  Stairs to Enter : 1  Railing: No  Stairs to Bedroom: (chair lift)       Lives With: Spouse  Drives: Yes  Patient Owned Equipment: Cane;Rolling walker  Patient Regularly Uses: Glasses - BASIC MOBILITY  How much difficulty does the patient currently have. ..  -   Turning over in bed (including adjusting bedclothes, sheets and blankets)?: A Little   -   Sitting down on and standing up from a chair with arms (e.g., wheelchair, bedside commo verbal cues for initiation. Elderly Mobility Scale= 8/20  1. Lying to sitting= 1/2  2. Sitting to lying= 2/2  3. Sitting to standing= 1/3  4. Standing=1/3  5. Gait= 0/3  6. Timed Walk= 3/3   7.  Function reach= 0/4    At end of session had a lengthy disc Mobility Scale indicates the patient is dependent in mobility maneuvers and requires help with basic ADL's, such as transfers, toileting and dressing.   The AM-PAC '6-Clicks' Inpatient Basic Mobility Short Form was completed and this patient is demonstratin

## 2020-06-16 NOTE — OCCUPATIONAL THERAPY NOTE
OCCUPATIONAL THERAPY EVALUATION - INPATIENT     Room Number: 2269/2297-A  Evaluation Date: 6/16/2020  Type of Evaluation: Initial  Presenting Problem: Abdominal Aortic Aneurysm    Physician Order: IP Consult to Occupational Therapy  Reason for Therapy: ADL Spouse    Toilet and Equipment: Standard height toilet  Shower/Tub and Equipment: Walk-in shower  Other Equipment: None    Occupation/Status: retired  Hand Dominance: Right  Drives: Yes  Patient Regularly Uses: Glasses    Prior Level of Function: Pt lives Little  -   Eating meals?: None    AM-PAC Score:  Score: 19  Approx Degree of Impairment: 42.8%  Standardized Score (AM-PAC Scale): 40.22  CMS Modifier (G-Code): CK    FUNCTIONAL TRANSFER ASSESSMENT  Supine to Sit : Contact guard assist  Sit to Stand: Mini assisted living facility for the intermittent supervision, med management, cleaning, cooking, and other IADL assist.  Spoke with dtr about this and dtr in agreement,    Patient Complexity  Occupational Profile/Medical History MODERATE - Expanded review of

## 2020-06-18 NOTE — OPERATIVE REPORT
[address]    PATIENT'S NAME: Loren Wilhelm   ATTENDING PHYSICIAN: Mark Rutledge M.D. OPERATING PHYSICIAN: Mark Rutledge M.D.    PATIENT ACCOUNT#:   [de-identified]    LOCATION:  [address] [place] [address]  MEDICAL RECORD #:   [id number]       DATE OF technique, and then placed an 8-Faroese sheath. In a similar fashion, I did the exact same set of steps on the left and placed an 8-Faroese sheath on the left after pre-closing and proceeded with the endovascular repair.   The patient was systemically hepari artery. We then completed deployment of the left main body so that the distal landing zone of the left ipsilateral limb was in the left common iliac artery.   A MOB balloon was then advanced up the 12-Indonesian sheath and the device was angioplastied entire s

## 2020-06-29 ENCOUNTER — LAB REQUISITION (OUTPATIENT)
Dept: LAB | Facility: HOSPITAL | Age: 85
End: 2020-06-29
Payer: MEDICARE

## 2020-06-29 ENCOUNTER — TELEPHONE (OUTPATIENT)
Dept: NEUROLOGY | Facility: CLINIC | Age: 85
End: 2020-06-29

## 2020-06-29 DIAGNOSIS — I25.10 ATHEROSCLEROTIC HEART DISEASE OF NATIVE CORONARY ARTERY WITHOUT ANGINA PECTORIS: ICD-10-CM

## 2020-06-29 LAB
ANION GAP SERPL CALC-SCNC: 7 MMOL/L (ref 0–18)
BUN BLD-MCNC: 24 MG/DL (ref 7–18)
BUN/CREAT SERPL: 19.5 (ref 10–20)
CALCIUM BLD-MCNC: 8.8 MG/DL (ref 8.5–10.1)
CHLORIDE SERPL-SCNC: 108 MMOL/L (ref 98–112)
CO2 SERPL-SCNC: 23 MMOL/L (ref 21–32)
CREAT BLD-MCNC: 1.23 MG/DL (ref 0.7–1.3)
GLUCOSE BLD-MCNC: 109 MG/DL (ref 70–99)
OSMOLALITY SERPL CALC.SUM OF ELEC: 291 MOSM/KG (ref 275–295)
POTASSIUM SERPL-SCNC: 4.5 MMOL/L (ref 3.5–5.1)
SODIUM SERPL-SCNC: 138 MMOL/L (ref 136–145)

## 2020-06-29 PROCEDURE — 80048 BASIC METABOLIC PNL TOTAL CA: CPT | Performed by: SURGERY

## 2020-07-13 NOTE — PROGRESS NOTES
HPI:    Patient ID: Dede Puentes is a 80year old male.   PCP: Dr Kaitlin Hidalgo    Mr Tatiana Brownlee is a 80year old male with history of hypertension, hyperlipidemia,TIA, recent abdominal aneurysm repair surgery who presented for evaluation of short term me SURGICAL PROSTATECTOMY, RETROPUBIC RADICAL, W/NERVE SPARING  5/1992    radical prostectomy   • LASER SURGERY OF EYE     • NASAL SURG PROC UNLISTED  1/1999    remove nasal polyps   • OTHER SURGICAL HISTORY  07/2020    Aortic aneurysm stent placed      Famil season. Drink water after use. 1 Bottle 12     Allergies:  Pcn [Penicillins]       RASH   PHYSICAL EXAM:   Physical Exam    Vitals reviewed  General: A pleasant 20-year-old male in no apparent distress  HEENT: Normocephalic and atraumatic.  Normal sclera  C starting Donepezil 10 mg to prevent further progression.  We recommend MRI brain to assess for vascular burden and to rule out silent infarcts  He is restricted from driving and requires supervision by his family  Counseled on stroke prevention, diet, cogni

## 2020-07-13 NOTE — PROGRESS NOTES
The patient daughter states the patient short term memory has decreased. Patient was advised to see a neurologist 2 years ago. Difficulty with slurring and stuttering. Denies facial numbness or tingling.

## 2020-07-28 ENCOUNTER — HOSPITAL ENCOUNTER (OUTPATIENT)
Dept: MRI IMAGING | Age: 85
Discharge: HOME | End: 2020-07-28
Attending: Other
Payer: MEDICARE

## 2020-07-28 DIAGNOSIS — F01.50 VASCULAR DEMENTIA WITHOUT BEHAVIORAL DISTURBANCE (HCC): ICD-10-CM

## 2020-07-28 PROCEDURE — 70551 MRI BRAIN STEM W/O DYE: CPT | Performed by: OTHER

## 2020-11-05 NOTE — TELEPHONE ENCOUNTER
Sent the patient a [email or web address] message to make an appt for medication refills.      Medication: DONEPEZIL HCL 10 MG Oral Tab    Date of last refill: 07/13/2020 (#30/2)  [place]    Last office visit: 07/13/2020  Due back t

## 2021-02-03 NOTE — PROGRESS NOTES
HPI:    Patient ID: Diana Rosario is a 80year old male. PCP: Dr Qiunteros Meals      Follow up visit: Patient is here for follow up for Dementia. He is here along with her daughter and both reports mild improvement in her memory.   He is tolerating Donepezil 1 cancer/surgery   • Reflux esophagitis    • Tubular adenoma     hyperplastic polyp      Past Surgical History:   Procedure Laterality Date   • ABDOMINAL AORTIC ANEURYSM ENDOVASCULAR N/A 6/15/2020    Performed by Adria Cabrera MD at [address] [facility] 1 tablet (240 mg total) by mouth daily. 90 tablet 1   • Sertraline HCl 50 MG Oral Tab Take 1 tablet (50 mg total) by mouth daily.  90 tablet 1   • ATORVASTATIN 40 MG Oral Tab TAKE 1 TABLET(40 MG) BY MOUTH EVERY NIGHT 90 tablet 3   • PREVIDENT 5000 DRY MOUTH behavioral disturbance ([facility] [address]  (primary encounter diagnosis)          Reviewed MRI brain results.    Continue Donepezil 10 mg daily and oral B12 supplements  Continue optimal risk factor control  Counseled again on stroke prevention, diet, cognitive exercise

## 2021-08-13 DIAGNOSIS — F01.50 VASCULAR DEMENTIA WITHOUT BEHAVIORAL DISTURBANCE (HCC): ICD-10-CM

## 2021-08-13 RX ORDER — DONEPEZIL HYDROCHLORIDE 10 MG/1
TABLET, FILM COATED ORAL
Qty: 30 TABLET | Refills: 0 | Status: SHIPPED | OUTPATIENT
Start: 2021-08-13

## 2021-08-13 NOTE — TELEPHONE ENCOUNTER
Medication: Donepezil HCl 10 MG Oral Tab    Date of last refill: 2/3/2021 (#90/5)  [place]    Last office visit: 2/3/2021  Due back to clinic per last office note:  1 year  Date next office visit scheduled:  Makayla morales

## 2021-11-17 PROBLEM — F01.50 VASCULAR DEMENTIA WITHOUT BEHAVIORAL DISTURBANCE (HCC): Status: ACTIVE | Noted: 2021-11-17

## 2022-05-27 DIAGNOSIS — F01.50 VASCULAR DEMENTIA WITHOUT BEHAVIORAL DISTURBANCE (HCC): ICD-10-CM

## 2022-05-27 RX ORDER — DONEPEZIL HYDROCHLORIDE 10 MG/1
TABLET, FILM COATED ORAL
Qty: 90 TABLET | Refills: 0 | OUTPATIENT
Start: 2022-05-27

## 2022-05-27 RX ORDER — DONEPEZIL HYDROCHLORIDE 10 MG/1
TABLET, FILM COATED ORAL
Qty: 30 TABLET | Refills: 0 | Status: SHIPPED | OUTPATIENT
Start: 2022-05-27

## 2022-08-01 DIAGNOSIS — F01.50 VASCULAR DEMENTIA WITHOUT BEHAVIORAL DISTURBANCE (HCC): ICD-10-CM

## 2022-08-01 RX ORDER — DONEPEZIL HYDROCHLORIDE 10 MG/1
TABLET, FILM COATED ORAL
Qty: 90 TABLET | Refills: 0 | Status: CANCELLED | OUTPATIENT
Start: 2022-08-01

## 2022-08-01 NOTE — TELEPHONE ENCOUNTER
Medication: DONEPEZIL 10 MG     Date of last refill: 5/27/22 (#30/0R)  [place]     Last office visit: 2/3/21  Due back to clinic per last office note:  1 yr  Date next office visit scheduled:    No future appointments. Last OV note recommendation:    ASSESSMENT/PLAN:   (F01.50) Vascular dementia without behavioral disturbance ([facility] [address] [facility]  (primary encounter diagnosis)          Reviewed MRI brain results. Continue Donepezil 10 mg daily and oral B12 supplements  Continue optimal risk factor control  Counseled again on stroke prevention, diet, cognitive exercises.

## 2022-08-02 NOTE — TELEPHONE ENCOUNTER
Patient hasn't been seen in over 1 year. Spoke with patient's daughter who accepted next available appointment in [place] on 10/24/22 at 9:10am.  Pended Rx to cover patient until then.

## 2022-08-04 RX ORDER — DONEPEZIL HYDROCHLORIDE 10 MG/1
TABLET, FILM COATED ORAL
Qty: 90 TABLET | Refills: 0 | Status: SHIPPED | OUTPATIENT
Start: 2022-08-04

## 2022-10-24 NOTE — PROGRESS NOTES
Patient states difficulty with name recall. Patient denies recent falls or head injury. Patient denies changes in speech.

## 2022-11-29 ENCOUNTER — TELEPHONE (OUTPATIENT)
Dept: NEUROLOGY | Facility: CLINIC | Age: 87
End: 2022-11-29

## 2023-02-08 ENCOUNTER — HOSPITAL ENCOUNTER (OUTPATIENT)
Dept: CV DIAGNOSTICS | Facility: HOSPITAL | Age: 88
Discharge: HOME | End: 2023-02-08
Attending: INTERNAL MEDICINE
Payer: MEDICARE

## 2023-02-08 DIAGNOSIS — I10 ESSENTIAL HYPERTENSION: ICD-10-CM

## 2023-02-08 PROCEDURE — 93306 TTE W/DOPPLER COMPLETE: CPT | Performed by: INTERNAL MEDICINE

## 2023-02-13 ENCOUNTER — APPOINTMENT (OUTPATIENT)
Dept: GENERAL RADIOLOGY | Age: 88
End: 2023-02-13
Attending: NURSE PRACTITIONER
Payer: MEDICARE

## 2023-02-13 ENCOUNTER — HOSPITAL ENCOUNTER (OUTPATIENT)
Age: 88
Discharge: HOME | End: 2023-02-13
Payer: MEDICARE

## 2023-02-13 VITALS
DIASTOLIC BLOOD PRESSURE: 65 MMHG | HEART RATE: 76 BPM | WEIGHT: 164 LBS | SYSTOLIC BLOOD PRESSURE: 122 MMHG | TEMPERATURE: 97 F | BODY MASS INDEX: 26 KG/M2 | OXYGEN SATURATION: 97 % | RESPIRATION RATE: 18 BRPM

## 2023-02-13 DIAGNOSIS — J18.9 COMMUNITY ACQUIRED PNEUMONIA OF RIGHT LOWER LOBE OF LUNG: ICD-10-CM

## 2023-02-13 DIAGNOSIS — J06.9 UPPER RESPIRATORY TRACT INFECTION, UNSPECIFIED TYPE: Primary | ICD-10-CM

## 2023-02-13 LAB
#MXD IC: 0.8 X10ˆ3/UL (ref 0.1–1)
BUN BLD-MCNC: 25 MG/DL (ref 7–18)
CHLORIDE BLD-SCNC: 102 MMOL/L (ref 98–112)
CO2 BLD-SCNC: 25 MMOL/L (ref 21–32)
CREAT BLD-MCNC: 1.3 MG/DL
GFR SERPLBLD BASED ON 1.73 SQ M-ARVRAT: 51 ML/MIN/1.73M2 (ref 60–?)
GLUCOSE BLD-MCNC: 96 MG/DL (ref 70–99)
HCT VFR BLD AUTO: 38.8 %
HCT VFR BLD CALC: 40 %
HGB BLD-MCNC: 12.8 G/DL
ISTAT IONIZED CALCIUM FOR CHEM 8: 1.19 MMOL/L (ref 1.12–1.32)
LYMPHOCYTES # BLD AUTO: 2.2 X10ˆ3/UL (ref 1–4)
LYMPHOCYTES NFR BLD AUTO: 28.5 %
MCH RBC QN AUTO: 30.4 PG (ref 26–34)
MCHC RBC AUTO-ENTMCNC: 33 G/DL (ref 31–37)
MCV RBC AUTO: 92.2 FL (ref 80–100)
MIXED CELL %: 10.4 %
NEUTROPHILS # BLD AUTO: 4.7 X10ˆ3/UL (ref 1.5–7.7)
NEUTROPHILS NFR BLD AUTO: 61.1 %
PLATELET # BLD AUTO: 158 X10ˆ3/UL (ref 150–450)
POTASSIUM BLD-SCNC: 4.2 MMOL/L (ref 3.6–5.1)
RBC # BLD AUTO: 4.21 X10ˆ6/UL
SODIUM BLD-SCNC: 138 MMOL/L (ref 136–145)
WBC # BLD AUTO: 7.7 X10ˆ3/UL (ref 4–11)

## 2023-02-13 PROCEDURE — 94640 AIRWAY INHALATION TREATMENT: CPT | Performed by: NURSE PRACTITIONER

## 2023-02-13 PROCEDURE — 85025 COMPLETE CBC W/AUTO DIFF WBC: CPT | Performed by: NURSE PRACTITIONER

## 2023-02-13 PROCEDURE — 99204 OFFICE O/P NEW MOD 45 MIN: CPT | Performed by: NURSE PRACTITIONER

## 2023-02-13 PROCEDURE — 80047 BASIC METABLC PNL IONIZED CA: CPT | Performed by: NURSE PRACTITIONER

## 2023-02-13 PROCEDURE — 71046 X-RAY EXAM CHEST 2 VIEWS: CPT | Performed by: NURSE PRACTITIONER

## 2023-02-13 RX ORDER — CEFPODOXIME PROXETIL 200 MG/1
200 TABLET, FILM COATED ORAL 2 TIMES DAILY
Qty: 10 TABLET | Refills: 0 | Status: SHIPPED | OUTPATIENT
Start: 2023-02-13 | End: 2023-02-18

## 2023-02-13 RX ORDER — DOXYCYCLINE HYCLATE 100 MG/1
100 CAPSULE ORAL 2 TIMES DAILY
Qty: 10 CAPSULE | Refills: 0 | Status: SHIPPED | OUTPATIENT
Start: 2023-02-13 | End: 2023-02-18

## 2023-02-13 RX ORDER — ALBUTEROL SULFATE 90 UG/1
4 AEROSOL, METERED RESPIRATORY (INHALATION) ONCE
Status: COMPLETED | OUTPATIENT
Start: 2023-02-13 | End: 2023-02-13

## 2023-02-13 NOTE — ED INITIAL ASSESSMENT (HPI)
Negative Covid PTA. Cough on & off for few weeks. Worse later in day. No relief w OTC cold & cough meds. Concerned about Pneumonia. Lives in [facility]. Denies fever or body aches.

## 2023-02-13 NOTE — DISCHARGE INSTRUCTIONS
- you need to follow up with your primary care doctor in 2-3 weeks for repeat xr.    You want to make sure that the \" mass like structure\" even though it states that this is consistent with pneumonia, that it is nothing more significant.    -You can take Tylenol, 650 mg every 6 hours as needed for pain, fever    -Take all your antibiotics as prescribed, even if you are feeling better    -Return to the emergency department with any worsening symptoms or concerns

## 2023-02-13 NOTE — ED NOTES
Case discussed with physician assistant patient has cough x-ray which shows evidence of a masslike consolidation could be pneumonia has a normal oxygen saturation and wants to go home will be treated with outpatient antibiotics and knows to follow-up with primary care regarding x-ray findings.

## 2023-02-22 ENCOUNTER — HOSPITAL ENCOUNTER (OUTPATIENT)
Dept: GENERAL RADIOLOGY | Age: 88
Discharge: HOME | End: 2023-02-22
Attending: FAMILY MEDICINE
Payer: MEDICARE

## 2023-02-22 DIAGNOSIS — J18.9 PNEUMONIA OF RIGHT LOWER LOBE DUE TO INFECTIOUS ORGANISM: ICD-10-CM

## 2023-02-22 PROCEDURE — 71046 X-RAY EXAM CHEST 2 VIEWS: CPT | Performed by: FAMILY MEDICINE

## 2023-10-18 DIAGNOSIS — F01.50 VASCULAR DEMENTIA WITHOUT BEHAVIORAL DISTURBANCE (HCC): ICD-10-CM

## 2023-10-19 ENCOUNTER — TELEPHONE (OUTPATIENT)
Dept: NEUROLOGY | Facility: CLINIC | Age: 88
End: 2023-10-19

## 2023-10-19 RX ORDER — DONEPEZIL HYDROCHLORIDE 10 MG/1
10 TABLET, FILM COATED ORAL NIGHTLY
Qty: 90 TABLET | Refills: 0 | Status: SHIPPED | OUTPATIENT
Start: 2023-10-19

## 2023-10-19 NOTE — TELEPHONE ENCOUNTER
Called and spoke with Lilliana Hammonds, patient's daughter, and explained that the medication: donepezil 10 MG Oral Tab is pended with provider (per TE dated 10/19/23 at 11:26 AM).

## 2023-10-19 NOTE — TELEPHONE ENCOUNTER
Pt's f/u appt is scheduled for 1/31/24. She is request a refill for donepezil 10 MG Oral Tab; [id number]. Please advise, Lakisha's best call back number is [phone]. Endorsed to RN for Provider.

## 2023-10-19 NOTE — TELEPHONE ENCOUNTER
Sent the patient a [employer] to make an appt for further medication refills. Medication: DONEPEZIL 10 MG Oral Tab      Date of last refill: 10/24/2022 (#90/3)  [place]     Last office visit: 10/24/2022  Due back to clinic per last office note:  1 year  Date next office visit scheduled:    No future appointments. Last OV note recommendation:    ASSESSMENT/PLAN:   (F01.50) Vascular dementia without behavioral disturbance ([facility] [address]  (primary encounter diagnosis)          Stable overall. Continue Donepezil 10 mg daily   Check B12 level and based on level will determine if he needs to continue the supplements     Discussed with your PCP regarding Paxil dose adjustment     Continue optimal risk factor control  Counseled again on stroke prevention, diet, cognitive exercises. Follow up in about 1 year or sooner if necessary  See orders and medications filed with this encounter. The patient and her daughter indicates understanding of these issues and agrees with the plan.

## 2024-01-03 NOTE — PROGRESS NOTES
Pt states here for follow up on vascular dementia. Pt states doing well. Pt is accompanied by daughter Lakisha

## 2024-01-03 NOTE — PROGRESS NOTES
HPI:    Patient ID: Merrick Stephenson is a 89 year old male.  PCP: Dr Miranda      Follow up visit: Dementia     Patient is a 89 year old male who is here for follow up for Vascular dementia. He is here along with her daughter and reports memory problem remains the same. He lives in a detention facility. States the custodial facility has lot of activities to do. He is on Donepezil 10 mg daily.   No significant change in his mood.     Mr Merrick Stephenson is a 89 year old male with history of hypertension, hyperlipidemia,TIA, recent abdominal aneurysm repair surgery who presented for evaluation of short term memory loss. History obtained from patient's daughter and from patient who reports progressive decline in his memory and cognitive skills for few years. He seems to have more memory and mood issues lately. Last month he was hospitalized at [facility] for aneurysm repair and developed visual hallucinations and episode of delirium requiring sedation. He is doing well at home and no sundowning and no hallucinations and delusions reported. He lives with his wife and able to do his daily activities by himself. His daughter helps him with doctors appointments and other important things. There is no known family history of dementia.         HISTORY:  Past Medical History:   Diagnosis Date    Abdominal aneurysm (HCC)     2.59cm sagittal,, 2.55 x3.23 cm transverse    Allergic rhinitis     Anxiety 12/12/2011    Arthritis     Atypical chest pain     Benign mole     Closed angle glaucoma     L eye, s/p laser    Colon polyps 2/23/2007    Depression     Diverticulosis 2/23/2007    GERD (gastroesophageal reflux disease)     High cholesterol     HTN (hypertension)     Hypercholesterolemia 12/12/2011    mild high cholesterol, low HDL    Hyperglycemia     Internal hemorrhoids 2/23/2007    Memory loss 4/22/2010    Migraine headache with aura     Myalgia 4/11/2007    Nasal polyps     Olecranon bursitis     Panic attack     Prostate cancer  (HCC)     prostate cancer/surgery    Reflux esophagitis     Tubular adenoma     hyperplastic polyp    Uses hearing aid 02/24/2021      Past Surgical History:   Procedure Laterality Date    APPENDECTOMY      CATARACT  august and september 2015    LAPAROSCOPY, SURGICAL PROSTATECTOMY, RETROPUBIC RADICAL, W/NERVE SPARING  5/1992    radical prostectomy    LASER SURGERY OF EYE      NASAL SURG PROC UNLISTED  1/1999    remove nasal polyps    OTHER SURGICAL HISTORY  07/2020    Aortic aneurysm stent placed      Family History   Problem Relation Age of Onset    Hypertension Mother     Diabetes Mother     Pacemaker Mother     Heart Disorder Mother         Heart Failure    Hypertension Father     Heart Attack Father     Asthma Daughter     Arthritis Daughter         hip replacement      Social History     Socioeconomic History    Marital status:     Number of children: 2   Occupational History    Occupation: Retired    Tobacco Use    Smoking status: Former    Smokeless tobacco: Never    Tobacco comments:     quit in 1982   Vaping Use    Vaping Use: Never used   Substance and Sexual Activity    Alcohol use: Yes     Alcohol/week: 0.0 - 0.8 standard drinks of alcohol     Comment: 1 drink 2-3 times per week    Drug use: No    Sexual activity: Not Currently     Partners: Female   Other Topics Concern    Caffeine Concern Yes     Comment: occ    Exercise Yes     Comment: 4-5 days per week   Social History Narrative        2 children    Quit smoking 1982    1 wine/night    No drugs    Retired  for a [employer]     Walks for exercise.  Attends exercise class [facility].    Still drives motor vehicle.    The patient's medical power of  is his daughter Carolina Peterson.  He would not wish extraordinary care in an end-of-life situation and has DNR paperwork at home.          Review of Systems   Constitutional: Negative.    HENT: Negative.     Eyes: Negative.    Respiratory: Negative.      Cardiovascular: Negative.    Gastrointestinal: Negative.    Endocrine: Negative.    Genitourinary: Negative.    Musculoskeletal: Negative.    Allergic/Immunologic: Negative.    Neurological: Negative.         + poor short term memory   Psychiatric/Behavioral:  Positive for memory loss. Negative for behavioral problems, confusion, hallucinations and sleep disturbance.    All other systems reviewed and are negative.           Current Outpatient Medications   Medication Sig Dispense Refill    donepezil 10 MG Oral Tab Take 1 tablet (10 mg total) by mouth nightly. 90 tablet 0    atorvastatin 40 MG Oral Tab Take 1 tablet (40 mg total) by mouth daily. 90 tablet 3    PREVIDENT 5000 DRY MOUTH 1.1 % Dental Gel       Fluticasone Propionate 50 MCG/ACT Nasal Suspension 2 sprays by Each Nare route daily. During allergy season. Drink water after use. 1 Bottle 12    latanoprost (XALATAN) 0.005 % Ophthalmic Solution Apply 1 drop to eye daily.  3    verapamil  MG Oral Tab CR Take 1 tablet (180 mg total) by mouth daily. Needs office visit for further refills. 90 tablet 3     Allergies:  Allergies   Allergen Reactions    Pcn [Penicillins] RASH      PHYSICAL EXAM:   Physical Exam  Blood pressure 124/62, pulse 70, resp. rate 16, weight 155 lb (70.3 kg).    General: A pleasant 89-year-old male in no apparent distress  HEENT: Normocephalic and atraumatic. Normal sclera  Cardiovascular: Normal rate, regular rhythm and normal heart sounds.    Pulmonary/Chest: Effort normal and breath sounds normal.   Abdominal: Soft. Bowel sounds are normal.   Skin: Skin is warm and dry.   Psychiatric:  normal mood and affect.   Neurological   Awake, alert and oriented to time, place and person.   Speech is fluent with intact comprehension, repetition and naming.   Cranial nerves:   II, III, IV, VI :Pupils round, equal and reactive to light  and accommodation bilaterally.    Extraocular muscle intact. Visual fields intact.   V: Normal facial  sensation   VII: Face is symmetric with normal strength.   VIII: Mild bilateral sensorineural hearing loss  IX, X: Symmetric palate elevation. Uvula in midline.   XI: Normal sternocleidomastoid and trapezius strength.   XII: Tongue is in midline with normal lateral movements.  Sensory : Intact to all modalities including light touch, pinprick, vibration and proprioception  Motor: Normal tone in all extremities. Strength is 5/5 in all muscle groups  Reflexes: symmetric and present  Coordination: Intact   Gait: deferred             ASSESSMENT/PLAN:   (F01.50) Vascular dementia without behavioral disturbance (HCC)  (primary encounter diagnosis)          Stable overall.  Continue Donepezil 10 mg daily   Check B12 level and based on level will determine if he needs to continue the supplements      Continue optimal risk factor control  Counseled again on stroke prevention, diet, cognitive exercises.     Follow up in about 1 year or sooner if necessary  See orders and medications filed with this encounter. The patient and her daughter indicates understanding of these issues and agrees with the plan.        Ariella Perdomo MD  [facility]      No orders of the defined types were placed in this encounter.      Meds This Visit:  Requested Prescriptions      No prescriptions requested or ordered in this encounter       Imaging & Referrals:  None       ID#[id number]    Memory Loss  The patient's primary symptoms include memory loss. Pertinent negatives include no confusion.   Neurologic Problem  The patient's primary symptoms include memory loss. Pertinent negatives include no confusion.   Dementia

## 2024-01-03 NOTE — PATIENT INSTRUCTIONS
Refill policies:    Allow 2-3 business days for refills; controlled substances may take longer.  Contact your pharmacy at least 5 days prior to running out of medication and have them send an electronic request or submit request through the “request refill” option in your [employer] account.  Refills are not addressed on weekends; covering physicians do not authorize routine medications on weekends.  No narcotics or controlled substances are refilled after noon on Fridays or by on call physicians.  By law, narcotics must be electronically prescribed.  A 30 day supply with no refills is the maximum allowed.  If your prescription is due for a refill, you may be due for a follow up appointment.  To best provide you care, patients receiving routine medications need to be seen at least once a year.  Patients receiving narcotic/controlled substance medications need to be seen at least once every 3 months.  In the event that your preferred pharmacy does not have the requested medication in stock (e.g. Backordered), it is your responsibility to find another pharmacy that has the requested medication available.  We will gladly send a new prescription to that pharmacy at your request.    Scheduling Tests:    If your physician has ordered radiology tests such as MRI or CT scans, please contact Central Scheduling at [phone] right away to schedule the test.  Once scheduled, the [facility] Centralized Referral Team will work with your insurance carrier to obtain pre-certification or prior authorization.  Depending on your insurance carrier, approval may take 3-10 days.  It is highly recommended patients assure they have received an authorization before having a test performed.  If test is done without insurance authorization, patient may be responsible for the entire amount billed.      Precertification and Prior Authorizations:  If your physician has recommended that you have a procedure or additional testing performed the [facility]  Centralized Referral Team will contact your insurance carrier to obtain pre-certification or prior authorization.    You are strongly encouraged to contact your insurance carrier to verify that your procedure/test has been approved and is a COVERED benefit.  Although the [facility] Centralized Referral Team does its due diligence, the insurance carrier gives the disclaimer that \"Although the procedure is authorized, this does not guarantee payment.\"    Ultimately the patient is responsible for payment.   Thank you for your understanding in this matter.  Paperwork Completion:  If you require FMLA or disability paperwork for your recovery, please make sure to either drop it off or have it faxed to our office at [phone]. Be sure the form has your name and date of birth on it.  The form will be faxed to our Forms Department and they will complete it for you.  There is a 25$ fee for all forms that need to be filled out.  Please be aware there is a 10-14 day turnaround time.  You will need to sign a release of information (KENDRICK) form if your paperwork does not come with one.  You may call the Forms Department with any questions at [phone].  Their fax number is [phone].

## 2024-12-18 DIAGNOSIS — F01.50 VASCULAR DEMENTIA WITHOUT BEHAVIORAL DISTURBANCE (HCC): ICD-10-CM

## 2024-12-18 RX ORDER — DONEPEZIL HYDROCHLORIDE 10 MG/1
10 TABLET, FILM COATED ORAL NIGHTLY
Qty: 90 TABLET | Refills: 3 | OUTPATIENT
Start: 2024-12-18

## 2024-12-18 NOTE — TELEPHONE ENCOUNTER
Duplicate request denied, routed to provider in separate encounter.    Medication: Donepezil 10mg     Date of last refill: 1/3/24 (#90/3)  [place]      Last office visit: 1/3/24  Due back to clinic per last office note:  1y  Date next office visit scheduled:    Future Appointments   Date Time Provider Department Center   1/10/2025  1:30 PM Ariella Perdomo MD [facility]           Last OV note recommendation:    (F01.50) Vascular dementia without behavioral disturbance (HCC)  (primary encounter diagnosis)          Stable overall.  Continue Donepezil 10 mg daily   Check B12 level and based on level will determine if he needs to continue the supplements        Continue optimal risk factor control  Counseled again on stroke prevention, diet, cognitive exercises.      Follow up in about 1 year or sooner if necessary

## 2024-12-18 NOTE — TELEPHONE ENCOUNTER
Sent the patient a Domo message to make an appointment for further medication refills.       Medication: DONEPEZIL 10 MG Oral Tab      Date of last refill: 01/03/2024 (#90/3)  [place]     Last office visit: 01/03/2024  Due back to clinic per last office note:  1 year  Date next office visit scheduled:    No future appointments.        Last OV note recommendation:    Assessment  ASSESSMENT/PLAN:   (F01.50) Vascular dementia without behavioral disturbance (HCC)  (primary encounter diagnosis)          Stable overall.  Continue Donepezil 10 mg daily   Check B12 level and based on level will determine if he needs to continue the supplements        Continue optimal risk factor control  Counseled again on stroke prevention, diet, cognitive exercises.      Follow up in about 1 year or sooner if necessary  See orders and medications filed with this encounter. The patient and her daughter indicates understanding of these issues and agrees with the plan.

## 2024-12-19 RX ORDER — DONEPEZIL HYDROCHLORIDE 10 MG/1
10 TABLET, FILM COATED ORAL NIGHTLY
Qty: 90 TABLET | Refills: 0 | Status: SHIPPED | OUTPATIENT
Start: 2024-12-19

## 2024-12-25 DIAGNOSIS — F01.50 VASCULAR DEMENTIA WITHOUT BEHAVIORAL DISTURBANCE (HCC): ICD-10-CM

## 2024-12-27 RX ORDER — DONEPEZIL HYDROCHLORIDE 10 MG/1
10 TABLET, FILM COATED ORAL NIGHTLY
Qty: 90 TABLET | Refills: 0 | OUTPATIENT
Start: 2024-12-27

## 2024-12-27 NOTE — TELEPHONE ENCOUNTER
Request for advance refills denied, patient picked up 90 days 12/20/24    Medication: Donepezil 10mg     Date of last refill: 12/19/24 (#90/0)  [place]      Last office visit: 1/3/24  Due back to clinic per last office note:  1y  Date next office visit scheduled:    Future Appointments   Date Time Provider Department Center   1/10/2025  1:30 PM Ariella Perdomo MD [facility]           Last OV note recommendation:    (F01.50) Vascular dementia without behavioral disturbance (HCC)  (primary encounter diagnosis)          Stable overall.  Continue Donepezil 10 mg daily   Check B12 level and based on level will determine if he needs to continue the supplements        Continue optimal risk factor control  Counseled again on stroke prevention, diet, cognitive exercises.      Follow up in about 1 year or sooner if necessary  See orders and medications filed with this encounter. The patient and her daughter indicates understanding of these issues and agrees with the plan.

## 2025-01-10 NOTE — PATIENT INSTRUCTIONS
Refill policies:    Allow 2-3 business days for refills; controlled substances may take longer.  Contact your pharmacy at least 5 days prior to running out of medication and have them send an electronic request or submit request through the “request refill” option in your [employer] account.  Refills are not addressed on weekends; covering physicians do not authorize routine medications on weekends.  No narcotics or controlled substances are refilled after noon on Fridays or by on call physicians.  By law, narcotics must be electronically prescribed.  A 30 day supply with no refills is the maximum allowed.  If your prescription is due for a refill, you may be due for a follow up appointment.  To best provide you care, patients receiving routine medications need to be seen at least once a year.  Patients receiving narcotic/controlled substance medications need to be seen at least once every 3 months.  In the event that your preferred pharmacy does not have the requested medication in stock (e.g. Backordered), it is your responsibility to find another pharmacy that has the requested medication available.  We will gladly send a new prescription to that pharmacy at your request.    Scheduling Tests:    If your physician has ordered radiology tests such as MRI or CT scans, please contact Central Scheduling at [phone] right away to schedule the test.  Once scheduled, the [facility] Centralized Referral Team will work with your insurance carrier to obtain pre-certification or prior authorization.  Depending on your insurance carrier, approval may take 3-10 days.  It is highly recommended patients assure they have received an authorization before having a test performed.  If test is done without insurance authorization, patient may be responsible for the entire amount billed.      Precertification and Prior Authorizations:  If your physician has recommended that you have a procedure or additional testing performed the [facility]  Centralized Referral Team will contact your insurance carrier to obtain pre-certification or prior authorization.    You are strongly encouraged to contact your insurance carrier to verify that your procedure/test has been approved and is a COVERED benefit.  Although the [facility] Centralized Referral Team does its due diligence, the insurance carrier gives the disclaimer that \"Although the procedure is authorized, this does not guarantee payment.\"    Ultimately the patient is responsible for payment.   Thank you for your understanding in this matter.  Paperwork Completion:  If you require FMLA or disability paperwork for your recovery, please make sure to either drop it off or have it faxed to our office at [phone]. Be sure the form has your name and date of birth on it.  The form will be faxed to our Forms Department and they will complete it for you.  There is a 25$ fee for all forms that need to be filled out.  Please be aware there is a 10-14 day turnaround time.  You will need to sign a release of information (KENDRICK) form if your paperwork does not come with one.  You may call the Forms Department with any questions at [phone].  Their fax number is [phone].

## 2025-01-10 NOTE — PROGRESS NOTES
HPI:    Patient ID: Merrick Stephenson is a 89 year old male.  PCP: Dr Miranda      Follow up visit: Dementia     Patient is a 89 year old male who is here for follow up for Vascular dementia. He is here along with her daughter and reports memory problem remains the same. He lives in a halfway facility. States the halfway facility has lot of activities to do. He is on Donepezil 10 mg daily. No significant change in his mood.     Mr Merrick Stephenson is a 89 year old male with history of hypertension, hyperlipidemia,TIA, recent abdominal aneurysm repair surgery who presented for evaluation of short term memory loss. History obtained from patient's daughter and from patient who reports progressive decline in his memory and cognitive skills for few years. He seems to have more memory and mood issues lately. Last month he was hospitalized at [facility] for aneurysm repair and developed visual hallucinations and episode of delirium requiring sedation. He is doing well at home and no sundowning and no hallucinations and delusions reported. He lives with his wife and able to do his daily activities by himself. His daughter helps him with doctors appointments and other important things. There is no known family history of dementia.         HISTORY:  Past Medical History:    Abdominal aneurysm (HCC)    2.59cm sagittal,, 2.55 x3.23 cm transverse    Allergic rhinitis    Anxiety    Arthritis    Atypical chest pain    Benign mole    Closed angle glaucoma    L eye, s/p laser    Colon polyps    Depression    Diverticulosis    GERD (gastroesophageal reflux disease)    High cholesterol    HTN (hypertension)    Hypercholesterolemia    mild high cholesterol, low HDL    Hyperglycemia    Internal hemorrhoids    Memory loss    Migraine headache with aura    Myalgia    Nasal polyps    Olecranon bursitis    Panic attack    Prostate cancer (HCC)    prostate cancer/surgery    Reflux esophagitis    Tubular adenoma    hyperplastic polyp    Uses  hearing aid      Past Surgical History:   Procedure Laterality Date    Appendectomy      Cataract  august and september 2015    Laparoscopy, surgical prostatectomy, retropubic radical, w/nerve sparing  5/1992    radical prostectomy    Laser surgery of eye      Nasal surg proc unlisted  1/1999    remove nasal polyps    Other surgical history  07/2020    Aortic aneurysm stent placed      Family History   Problem Relation Age of Onset    Hypertension Mother     Diabetes Mother     Pacemaker Mother     Heart Disorder Mother         Heart Failure    Hypertension Father     Heart Attack Father     Asthma Daughter     Arthritis Daughter         hip replacement      Social History     Socioeconomic History    Marital status:     Number of children: 2   Occupational History    Occupation: Retired    Tobacco Use    Smoking status: Former    Smokeless tobacco: Never    Tobacco comments:     quit in 1982   Vaping Use    Vaping status: Never Used   Substance and Sexual Activity    Alcohol use: Yes     Alcohol/week: 0.0 - 0.8 standard drinks of alcohol     Comment: 1 drink 2-3 times per week    Drug use: No    Sexual activity: Not Currently     Partners: Female   Other Topics Concern    Caffeine Concern Yes     Comment: occ    Exercise Yes     Comment: 4-5 days per week   Social History Narrative        2 children    Quit smoking 1982    1 wine/night    No drugs    Retired  for a [employer]     Walks for exercise.  [place] [facility] [place] motor vehicle.    The patient's medical power of  is his daughter Carolina Peterson.  He would not wish extraordinary care in an end-of-life situation and has DNR paperwork at home.          Review of Systems   Constitutional: Negative.    HENT: Negative.     Eyes: Negative.    Respiratory: Negative.     Cardiovascular: Negative.    Gastrointestinal: Negative.    Endocrine: Negative.    Genitourinary: Negative.     Musculoskeletal: Negative.    Allergic/Immunologic: Negative.    Neurological: Negative.         + poor short term memory   Psychiatric/Behavioral:  Positive for memory loss. Negative for behavioral problems, confusion, hallucinations and sleep disturbance.    All other systems reviewed and are negative.           Current Outpatient Medications   Medication Sig Dispense Refill    DONEPEZIL 10 MG Oral Tab TAKE 1 TABLET(10 MG) BY MOUTH EVERY NIGHT 90 tablet 0    atorvastatin 40 MG Oral Tab Take 1 tablet (40 mg total) by mouth daily. 90 tablet 3    PREVIDENT 5000 DRY MOUTH 1.1 % Dental Gel       latanoprost (XALATAN) 0.005 % Ophthalmic Solution Apply 1 drop to eye daily.  3    Fluticasone Propionate 50 MCG/ACT Nasal Suspension 2 sprays by Each Nare route daily. During allergy season. Drink water after use. 1 Bottle 12     Allergies:  Allergies   Allergen Reactions    Pcn [Penicillins] RASH      PHYSICAL EXAM:   Physical Exam  Blood pressure 130/70, pulse 82, resp. rate 16, weight 147 lb (66.7 kg).    General: A pleasant 89-year-old male in no apparent distress  HEENT: Normocephalic and atraumatic. Normal sclera  Cardiovascular: Normal rate, regular rhythm and normal heart sounds.    Pulmonary/Chest: Effort normal and breath sounds normal.   Abdominal: Soft. Bowel sounds are normal.   Skin: Skin is warm and dry.   Psychiatric:  normal mood and affect.   Neurological   Awake, alert and oriented to time, place and person.   Speech is fluent with intact comprehension, repetition and naming.   Cranial nerves:   II, III, IV, VI :Pupils round, equal and reactive to light  and accommodation bilaterally.    Extraocular muscle intact. Visual fields intact.   V: Normal facial sensation   VII: Face is symmetric with normal strength.   VIII: Mild bilateral sensorineural hearing loss  IX, X: Symmetric palate elevation. Uvula in midline.   XI: Normal sternocleidomastoid and trapezius strength.   XII: Tongue is in midline with  normal lateral movements.  Sensory : Intact to all modalities including light touch, pinprick, vibration and proprioception  Motor: Normal tone in all extremities. Strength is 5/5 in all muscle groups  Reflexes: symmetric and present  Coordination: Intact   Gait: deferred             ASSESSMENT/PLAN:   (F01.50) Vascular dementia without behavioral disturbance (HCC)  (primary encounter diagnosis)          Stable overall.  Continue Donepezil 10 mg daily     Continue optimal risk factor control  Counseled again on stroke prevention, diet, cognitive exercises.     Follow up in about 1 year or sooner if necessary  See orders and medications filed with this encounter. The patient and her daughter indicates understanding of these issues and agrees with the plan.        Ariella Perdomo MD  [facility]      No orders of the defined types were placed in this encounter.      Meds This Visit:  Requested Prescriptions      No prescriptions requested or ordered in this encounter       Imaging & Referrals:  None       ID#[id number]    Neurologic Problem  The patient's primary symptoms include memory loss. Pertinent negatives include no confusion.   Dementia    Memory Loss  The patient's primary symptoms include memory loss. Pertinent negatives include no confusion.

## 2025-02-23 ENCOUNTER — APPOINTMENT (OUTPATIENT)
Dept: CT IMAGING | Facility: HOSPITAL | Age: OVER 89
DRG: 179 | End: 2025-02-23
Attending: EMERGENCY MEDICINE
Payer: MEDICARE

## 2025-02-23 ENCOUNTER — HOSPITAL ENCOUNTER (INPATIENT)
Facility: HOSPITAL | Age: OVER 89
LOS: 4 days | Discharge: NURSING, REHAB OR LONG-TERM CARE | End: 2025-02-28
Attending: EMERGENCY MEDICINE | Admitting: INTERNAL MEDICINE
Payer: MEDICARE

## 2025-02-23 ENCOUNTER — HOSPITAL ENCOUNTER (INPATIENT)
Facility: HOSPITAL | Age: OVER 89
LOS: 4 days | Discharge: NURSING, REHAB OR LONG-TERM CARE | DRG: 179 | End: 2025-02-28
Attending: EMERGENCY MEDICINE | Admitting: INTERNAL MEDICINE
Payer: MEDICARE

## 2025-02-23 ENCOUNTER — APPOINTMENT (OUTPATIENT)
Dept: CT IMAGING | Facility: HOSPITAL | Age: OVER 89
End: 2025-02-23
Attending: EMERGENCY MEDICINE
Payer: MEDICARE

## 2025-02-23 DIAGNOSIS — U07.1 COVID-19: ICD-10-CM
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
## 2025-02-23 DIAGNOSIS — J11.1 FLU: ICD-10-CM

## 2025-02-23 PROBLEM — D69.6 THROMBOCYTOPENIA: Status: ACTIVE | Noted: 2025-02-23

## 2025-02-23 PROBLEM — D64.9 ANEMIA: Status: ACTIVE | Noted: 2025-02-23

## 2025-02-23 PROBLEM — R79.89 AZOTEMIA: Status: ACTIVE | Noted: 2025-02-23

## 2025-02-23 PROBLEM — R73.9 HYPERGLYCEMIA: Status: ACTIVE | Noted: 2025-02-23

## 2025-02-23 LAB
ALBUMIN SERPL-MCNC: 4 G/DL (ref 3.2–4.8)
ALBUMIN/GLOB SERPL: 1.5 {RATIO} (ref 1–2)
ALP LIVER SERPL-CCNC: 89 U/L
ALT SERPL-CCNC: 13 U/L
ANION GAP SERPL CALC-SCNC: 7 MMOL/L (ref 0–18)
AST SERPL-CCNC: 31 U/L (ref ?–34)
ATRIAL RATE: 78 BPM
BASOPHILS # BLD AUTO: 0.03 X10(3) UL (ref 0–0.2)
BASOPHILS NFR BLD AUTO: 0.5 %
BILIRUB SERPL-MCNC: 0.4 MG/DL (ref 0.2–0.9)
BUN BLD-MCNC: 31 MG/DL (ref 9–23)
CALCIUM BLD-MCNC: 9 MG/DL (ref 8.7–10.6)
CHLORIDE SERPL-SCNC: 105 MMOL/L (ref 98–112)
CO2 SERPL-SCNC: 26 MMOL/L (ref 21–32)
CREAT BLD-MCNC: 1.36 MG/DL
EGFRCR SERPLBLD CKD-EPI 2021: 48 ML/MIN/1.73M2 (ref 60–?)
EOSINOPHIL # BLD AUTO: 0.29 X10(3) UL (ref 0–0.7)
EOSINOPHIL NFR BLD AUTO: 4.9 %
ERYTHROCYTE [DISTWIDTH] IN BLOOD BY AUTOMATED COUNT: 12.7 %
FLUAV + FLUBV RNA SPEC NAA+PROBE: NEGATIVE
FLUAV + FLUBV RNA SPEC NAA+PROBE: POSITIVE
GLOBULIN PLAS-MCNC: 2.7 G/DL (ref 2–3.5)
GLUCOSE BLD-MCNC: 109 MG/DL (ref 70–99)
HCT VFR BLD AUTO: 35.4 %
HGB BLD-MCNC: 11.9 G/DL
IMM GRANULOCYTES # BLD AUTO: 0.01 X10(3) UL (ref 0–1)
IMM GRANULOCYTES NFR BLD: 0.2 %
LYMPHOCYTES # BLD AUTO: 1.71 X10(3) UL (ref 1–4)
LYMPHOCYTES NFR BLD AUTO: 28.9 %
MCH RBC QN AUTO: 30.4 PG (ref 26–34)
MCHC RBC AUTO-ENTMCNC: 33.6 G/DL (ref 31–37)
MCV RBC AUTO: 90.3 FL
MONOCYTES # BLD AUTO: 0.68 X10(3) UL (ref 0.1–1)
MONOCYTES NFR BLD AUTO: 11.5 %
NEUTROPHILS # BLD AUTO: 3.2 X10 (3) UL (ref 1.5–7.7)
NEUTROPHILS # BLD AUTO: 3.2 X10(3) UL (ref 1.5–7.7)
NEUTROPHILS NFR BLD AUTO: 54 %
OSMOLALITY SERPL CALC.SUM OF ELEC: 293 MOSM/KG (ref 275–295)
P-R INTERVAL: 174 MS
PLATELET # BLD AUTO: 126 10(3)UL (ref 150–450)
POTASSIUM SERPL-SCNC: 4.5 MMOL/L (ref 3.5–5.1)
PROT SERPL-MCNC: 6.7 G/DL (ref 5.7–8.2)
Q-T INTERVAL: 392 MS
QRS DURATION: 96 MS
QTC CALCULATION (BEZET): 446 MS
R AXIS: 90 DEGREES
RBC # BLD AUTO: 3.92 X10(6)UL
RSV RNA SPEC NAA+PROBE: NEGATIVE
SARS-COV-2 RNA RESP QL NAA+PROBE: DETECTED
SODIUM SERPL-SCNC: 138 MMOL/L (ref 136–145)
T AXIS: -16 DEGREES
VENTRICULAR RATE: 78 BPM
WBC # BLD AUTO: 5.9 X10(3) UL (ref 4–11)

## 2025-02-23 PROCEDURE — 70450 CT HEAD/BRAIN W/O DYE: CPT | Performed by: EMERGENCY MEDICINE

## 2025-02-23 RX ORDER — ACETAMINOPHEN 500 MG
500 TABLET ORAL EVERY 4 HOURS PRN
Status: DISCONTINUED | OUTPATIENT
Start: 2025-02-23 | End: 2025-02-28

## 2025-02-23 RX ORDER — PAROXETINE 20 MG/1
20 TABLET, FILM COATED ORAL DAILY
Status: DISCONTINUED | OUTPATIENT
Start: 2025-02-23 | End: 2025-02-28

## 2025-02-23 RX ORDER — HYDRALAZINE HYDROCHLORIDE 20 MG/ML
10 INJECTION INTRAMUSCULAR; INTRAVENOUS EVERY 6 HOURS PRN
Status: DISCONTINUED | OUTPATIENT
Start: 2025-02-23 | End: 2025-02-27

## 2025-02-23 RX ORDER — HEPARIN SODIUM 5000 [USP'U]/ML
5000 INJECTION, SOLUTION INTRAVENOUS; SUBCUTANEOUS EVERY 8 HOURS SCHEDULED
Status: DISCONTINUED | OUTPATIENT
Start: 2025-02-24 | End: 2025-02-28

## 2025-02-23 RX ORDER — OSELTAMIVIR PHOSPHATE 30 MG/1
30 CAPSULE ORAL 2 TIMES DAILY
Status: DISCONTINUED | OUTPATIENT
Start: 2025-02-23 | End: 2025-02-23

## 2025-02-23 RX ORDER — ENOXAPARIN SODIUM 100 MG/ML
40 INJECTION SUBCUTANEOUS NIGHTLY
Status: DISCONTINUED | OUTPATIENT
Start: 2025-02-23 | End: 2025-02-23

## 2025-02-23 RX ORDER — VERAPAMIL HYDROCHLORIDE 120 MG/1
120 TABLET, FILM COATED, EXTENDED RELEASE ORAL DAILY
Status: DISCONTINUED | OUTPATIENT
Start: 2025-02-23 | End: 2025-02-28

## 2025-02-23 RX ORDER — OSELTAMIVIR PHOSPHATE 30 MG/1
30 CAPSULE ORAL DAILY
Status: DISPENSED | OUTPATIENT
Start: 2025-02-23 | End: 2025-02-27

## 2025-02-23 RX ORDER — QUETIAPINE FUMARATE 25 MG/1
25 TABLET, FILM COATED ORAL NIGHTLY
Status: DISCONTINUED | OUTPATIENT
Start: 2025-02-23 | End: 2025-02-28

## 2025-02-23 RX ORDER — SODIUM CHLORIDE 9 MG/ML
INJECTION, SOLUTION INTRAVENOUS ONCE
Status: COMPLETED | OUTPATIENT
Start: 2025-02-23 | End: 2025-02-23

## 2025-02-23 RX ORDER — LATANOPROST 50 UG/ML
1 SOLUTION/ DROPS OPHTHALMIC DAILY
Status: DISCONTINUED | OUTPATIENT
Start: 2025-02-23 | End: 2025-02-28

## 2025-02-23 RX ORDER — ATORVASTATIN CALCIUM 40 MG/1
40 TABLET, FILM COATED ORAL DAILY
Status: DISCONTINUED | OUTPATIENT
Start: 2025-02-23 | End: 2025-02-28

## 2025-02-23 RX ORDER — SODIUM CHLORIDE 9 MG/ML
1000 INJECTION, SOLUTION INTRAVENOUS CONTINUOUS
Status: DISCONTINUED | OUTPATIENT
Start: 2025-02-23 | End: 2025-02-23

## 2025-02-23 RX ORDER — DONEPEZIL HYDROCHLORIDE 10 MG/1
10 TABLET, FILM COATED ORAL NIGHTLY
Status: DISCONTINUED | OUTPATIENT
Start: 2025-02-23 | End: 2025-02-28

## 2025-02-23 NOTE — H&P
[facility] Hospitalist History and Physical      PCP: Nelly Miranda MD    History of Present Illness: This is the story of Mr. Merrick Stephenson who is a 89 y/o M w/ PMHx of HLD, Dementia who presents to the hospital after multiple falls in the past 1-2 weeks. Denies antecedent dizziness/lightheadedness, chest pain, sob, n/v/d, sick contacts, fevers, dysuria, increased frequency. States he has just been unsteady progressively as he is getting older. History is limited as patient is mildly confused.  ED Vitals: HTN  Labs:  Near baseline Cr  HgB 11.9  Plts 126    CTH: negative    Patient was admitted due to safety concerns at home.    Past Medical History:    Abdominal aneurysm    2.59cm sagittal,, 2.55 x3.23 cm transverse    Allergic rhinitis    Anxiety    Arthritis    Atypical chest pain    Benign mole    Closed angle glaucoma    L eye, s/p laser    Colon polyps    Depression    Diverticulosis    GERD (gastroesophageal reflux disease)    High blood pressure    High cholesterol    HTN (hypertension)    Hypercholesterolemia    mild high cholesterol, low HDL    Hyperglycemia    Internal hemorrhoids    Memory loss    Migraine headache with aura    Myalgia    Nasal polyps    Olecranon bursitis    Panic attack    Prostate cancer (HCC)    prostate cancer/surgery    Reflux esophagitis    Tubular adenoma    hyperplastic polyp    Uses hearing aid      Past Surgical History:   Procedure Laterality Date    Appendectomy      Cataract  august and september 2015    Laparoscopy, surgical prostatectomy, retropubic radical, w/nerve sparing  5/1992    radical prostectomy    Laser surgery of eye      Nasal surg proc unlisted  1/1999    remove nasal polyps    Other surgical history  07/2020    Aortic aneurysm stent placed        No current outpatient medications on file.       Social History     Tobacco Use    Smoking status: Former     Passive exposure: Never    Smokeless tobacco: Never    Tobacco comments:     quit in 1982    Substance Use Topics    Alcohol use: Yes     Alcohol/week: 0.0 - 0.8 standard drinks of alcohol     Comment: 1 drink 2-3 times per week        OBJECTIVE:  BP (!) 177/87   Pulse 87   Temp 97.9 °F (36.6 °C) (Oral)   Resp 18   Ht 5' 7\" (1.702 m)   Wt 145 lb (65.8 kg)   SpO2 97%   BMI 22.71 kg/m²   Gen: No acute distress, alert and oriented x1-2 (baseline?)  Pulm: Lungs clear bilaterally, normal respiratory effort  CV: Heart with regular rate and rhythm  Abd: Abdomen soft, nontender, non-distended  Skin: no rashes or lesions  Extremities: no edema noted  Neuro:  no focal deficits      Data Review:    Pertinent Labs and Imaging independently reviewed    Assessment/Plan:   Outpatient records or previous hospital records reviewed.   [facility] Hospitalist to continue to follow patient while in house    A/P: 89 y/o M w/ PMHx of HLD, Dementia who presents to the hospital after multiple falls in the past 1-2 weeks    Recurrent Falls  Possible contribution of 2 viral infections  Hx of Dementia/Cognitive Decline  Plan:  - PT/OT evaluations  - Do not suspect additional testing will provide any further benefit    Influenza A Infection  COVID19  Plan:  - Continue Tamiflu  - No advanced COVID therapies needed    HTN  - Verapamil    HLD  - Statin    A total of 32 minutes taken with patient and coordinating care.  Greater than 50% face to face encounter.      Demian Farrell D.O.  [facility] Hospitalist

## 2025-02-23 NOTE — ED INITIAL ASSESSMENT (HPI)
Here per EMS after pt's second fall today. Pt was found after he fell in the bathroom 30 mins pta. EMS was called there earlier for another fall but pt and POA refused due to pt had no c/o. Pt denies c/o at this time. Lives in the independent living section of [facility], a/ox2 per baseline. Family expressed concern to EMS that pt needs to be placed in the assisted living area.

## 2025-02-23 NOTE — ED QUICK NOTES
Orders for admission, patient is aware of plan and ready to go upstairs. Any questions, please call ED RN Lorene  at extension [phone].     Patient Covid vaccination status: Fully vaccinated     COVID Test Ordered in ED: SARS-CoV-2/Flu A and B/RSV by PCR (GeneXpert)    COVID Suspicion at Admission: N/A    Running Infusions:    sodium chloride 1,000 mL (02/23/25 0130)        Mental Status/LOC at time of transport: A&Ox3    Other pertinent information:   CIWA score: N/A   NIH score:  N/A

## 2025-02-23 NOTE — ED PROVIDER NOTES
Patient Seen in: [facility] Emergency Department      History     Chief Complaint   Patient presents with    Fall     Stated Complaint:     Subjective:   HPI      Patient is a 89-year-old male presents to ED for evaluation of falling.  Patient was brought in by EMS from [facility].  Patient recalls 1 fall earlier in the day.  Not sure how or why he fell.  Does not think he hit his head.  He has no physical complaints.  He denies any head or neck pain.  No chest or abdominal pain.  No extremity or pelvic pain.  Nurses notes state EMS was called there for the first fall but POA refused due to the fact that he had no complaints.  Patient fell again.  Nurses note 30 minutes prior to arrival prompting ED visit.  Family expressed concerns to EMS the patient needs to be placed in assisted living area    Objective:     Past Medical History:    Abdominal aneurysm    2.59cm sagittal,, 2.55 x3.23 cm transverse    Allergic rhinitis    Anxiety    Arthritis    Atypical chest pain    Benign mole    Closed angle glaucoma    L eye, s/p laser    Colon polyps    Depression    Diverticulosis    GERD (gastroesophageal reflux disease)    High cholesterol    HTN (hypertension)    Hypercholesterolemia    mild high cholesterol, low HDL    Hyperglycemia    Internal hemorrhoids    Memory loss    Migraine headache with aura    Myalgia    Nasal polyps    Olecranon bursitis    Panic attack    Prostate cancer (HCC)    prostate cancer/surgery    Reflux esophagitis    Tubular adenoma    hyperplastic polyp    Uses hearing aid              Past Surgical History:   Procedure Laterality Date    Appendectomy      Cataract  august and september 2015    Laparoscopy, surgical prostatectomy, retropubic radical, w/nerve sparing  5/1992    radical prostectomy    Laser surgery of eye      Nasal surg proc unlisted  1/1999    remove nasal polyps    Other surgical history  07/2020    Aortic aneurysm stent placed                Social History      Socioeconomic History    Marital status:     Number of children: 2   Occupational History    Occupation: Retired    Tobacco Use    Smoking status: Former     Passive exposure: Never    Smokeless tobacco: Never    Tobacco comments:     quit in 1982   Vaping Use    Vaping status: Never Used   Substance and Sexual Activity    Alcohol use: Yes     Alcohol/week: 0.0 - 0.8 standard drinks of alcohol     Comment: 1 drink 2-3 times per week    Drug use: No    Sexual activity: Not Currently     Partners: Female   Other Topics Concern    Caffeine Concern Yes     Comment: occ    Exercise Yes     Comment: 4-5 days per week   Social History Narrative        2 children    Quit smoking 1982    1 wine/night    No drugs    Retired  for a [employer]     Walks for exercise.  Attends exercise class at [facility] [place].    Still drives motor vehicle.    The patient's medical power of  is his daughter Carolina Peterson.  He would not wish extraordinary care in an end-of-life situation and has DNR paperwork at home.                  Physical Exam     ED Triage Vitals [02/23/25 0025]   /79   Pulse 78   Resp 20   Temp 98.1 °F (36.7 °C)   Temp src Oral   SpO2 100 %   O2 Device None (Room air)       Current Vitals:   Vital Signs  BP: (!) 174/84  Pulse: 77  Resp: 15  Temp: 98.1 °F (36.7 °C)  Temp src: Oral  MAP (mmHg): (!) 108    Oxygen Therapy  SpO2: 98 %  O2 Device: None (Room air)        Physical Exam  GENERAL: No apparent distress, nontoxic, well-appearing.  HEENT: Normocephalic, atraumatic.  Moist mucous membranes.  Pupils equal round reactive to light accommodation, extraocular motion is intact, sclerae white, conjunctiva is pink.  Oropharynx is unremarkable, no exudate, dentition intact, no facial bone tenderness or septal hematomas, TMs clear bilaterally  NECK: Supple, trachea midline, no lymphadenopathy, full ROM cervical spine without any pain, no pain on axial loading.      No  midline cervical spine tenderness  LUNG: Lungs clear to auscultation bilaterally, no wheezing, no rales, no rhonchi.  CARDIOVASCULAR: Regular rate and rhythm, normal S1-S2, no S3-S4 or murmur  CHEST: No tenderness, no crepitus, no ecchymosis, no abrasions  BACK: No midline lumbar or thoracic tenderness, no evidence for ecchymosis or abrasions, no CVA tenderness  PELVIS: Pelvis is stable to compression.  ABDOMEN: Bowel sounds are present, soft, nondistended, no pulsatile masses, nontender  MUSCULOSKELETAL: No calf tenderness, no clubbing, no cyanosis, or edema.  Dorsalis pedis and posterior tibial pulses 2+.  No long bone tenderness or deformities noted.  Patient has some bruising to his right lateral proximal fibula but no tenderness in this area.  SKIN EXAMINATION: Warm and dry.  No rashes   NEUROLOGICAL:  alert and oriented X 3, motor 5/5 all groups, normal sensation, speech is intact.    ED Course     Labs Reviewed   CBC WITH DIFFERENTIAL WITH PLATELET - Abnormal; Notable for the following components:       Result Value    HGB 11.9 (*)     HCT 35.4 (*)     .0 (*)     All other components within normal limits   COMP METABOLIC PANEL (14) - Abnormal; Notable for the following components:    Glucose 109 (*)     BUN 31 (*)     Creatinine 1.36 (*)     eGFR-Cr 48 (*)     All other components within normal limits   SARS-COV-2/FLU A AND B/RSV BY PCR (GENEXPERT) - Abnormal; Notable for the following components:    SARS-CoV-2 (COVID-19) - (GeneXpert) Detected (*)     Influenza A by PCR Positive (*)     All other components within normal limits    Narrative:     This test is intended for the qualitative detection and differentiation of SARS-CoV-2, influenza A, influenza B, and respiratory syncytial virus (RSV) viral RNA in nasopharyngeal or nares swabs from individuals suspected of respiratory viral infection consistent with COVID-19 by their healthcare provider. Signs and symptoms of respiratory viral infection due  to SARS-CoV-2, influenza, and RSV can be similar.    Test performed using the Xpert Xpress SARS-CoV-2/FLU/RSV (real time RT-PCR)  assay on the GeneXpert instrument, [employer], [place] [address].   This test is being used under the Food and Drug Administration's Emergency Use Authorization.    The authorized Fact Sheet for Healthcare Providers for this assay is available upon request from the laboratory.   RAINBOW DRAW LAVENDER   RAINBOW DRAW LIGHT GREEN   RAINBOW DRAW BLUE   RAINBOW DRAW GOLD   Hemoglobin 11.9         I personally reviewd CT images of head and independent interpretation shows no acute bleed.  I also viewed formal radiology report as read by radiology with findings below:    CT of head read by vision rad radiology shows no acute abnormality    Medications   sodium chloride 0.9% infusion 1,000 mL (1,000 mL Intravenous Handoff 2/23/25 9623)   oseltamivir (Tamiflu) cap 30 mg (30 mg Oral Not Given 2/23/25 0248)          MDM      Patient is 89-year-old male presents to ED for evaluation of fall x 2 yesterday at independent living facility.  Family requesting higher level of care.  Differential head injury, dehydration, electrolyte abnormality.  Patient has elevated BUN and creatinine of 31 and 1.36.  Similar elevation of creatinine 1 year ago of 1.25.  Patient has slight anemia.  Patient given IV fluids.  I did obtain a head CT.  Head CT was negative.  COVID and flu positive.  Patient treated with Tamiflu.  Will admit to hospital for  evaluation to assess for higher level of care at nursing facility.  Case discussed with hospitalist.  Workup and results were discussed with patient. Patient has no other questions, complaints or concerns. Patient will be admitted to the hospital for further workup.    Admission disposition: 2/23/2025  2:47 AM           Medical Decision Making      Disposition and Plan     Clinical Impression:  1. Fall, initial encounter    2. COVID-19    3. Flu          Disposition:  Admit  2/23/2025  2:47 am    Follow-up:  No follow-up provider specified.        Medications Prescribed:  Current Discharge Medication List              Supplementary Documentation:         Hospital Problems       Present on Admission  Date Reviewed: 1/10/2025            ICD-10-CM Noted POA    * (Principal) Fall, initial encounter W19.XXXA 2/23/2025 Unknown    Anemia D64.9 2/23/2025 Yes    Azotemia R79.89 2/23/2025 Yes    Hyperglycemia R73.9 2/23/2025 Yes    Thrombocytopenia (HCC) D69.6 2/23/2025 Yes

## 2025-02-24 LAB
ANION GAP SERPL CALC-SCNC: 9 MMOL/L (ref 0–18)
BUN BLD-MCNC: 17 MG/DL (ref 9–23)
CALCIUM BLD-MCNC: 9 MG/DL (ref 8.7–10.6)
CHLORIDE SERPL-SCNC: 107 MMOL/L (ref 98–112)
CO2 SERPL-SCNC: 22 MMOL/L (ref 21–32)
CREAT BLD-MCNC: 1.06 MG/DL
EGFRCR SERPLBLD CKD-EPI 2021: 64 ML/MIN/1.73M2 (ref 60–?)
ERYTHROCYTE [DISTWIDTH] IN BLOOD BY AUTOMATED COUNT: 12.5 %
GLUCOSE BLD-MCNC: 80 MG/DL (ref 70–99)
HCT VFR BLD AUTO: 38.5 %
HGB BLD-MCNC: 12.1 G/DL
MAGNESIUM SERPL-MCNC: 1.8 MG/DL (ref 1.6–2.6)
MCH RBC QN AUTO: 30.6 PG (ref 26–34)
MCHC RBC AUTO-ENTMCNC: 31.4 G/DL (ref 31–37)
MCV RBC AUTO: 97.2 FL
OSMOLALITY SERPL CALC.SUM OF ELEC: 287 MOSM/KG (ref 275–295)
PLATELET # BLD AUTO: 129 10(3)UL (ref 150–450)
PLATELETS.RETICULATED NFR BLD AUTO: 1.4 % (ref 0–7)
POTASSIUM SERPL-SCNC: 4.7 MMOL/L (ref 3.5–5.1)
RBC # BLD AUTO: 3.96 X10(6)UL
SODIUM SERPL-SCNC: 138 MMOL/L (ref 136–145)
WBC # BLD AUTO: 4.6 X10(3) UL (ref 4–11)

## 2025-02-24 RX ORDER — MAGNESIUM SULFATE HEPTAHYDRATE 40 MG/ML
2 INJECTION, SOLUTION INTRAVENOUS ONCE
Status: COMPLETED | OUTPATIENT
Start: 2025-02-24 | End: 2025-02-24

## 2025-02-24 NOTE — PROGRESS NOTES
VSS on room air. Droplet and contact isolation for Flu and Covid. Pt is confused, still trying to get out of bed, removing gown and primofit. Continuing posey vest for safety. Bed in lowest position, rounding, call light in reach.

## 2025-02-24 NOTE — PROGRESS NOTES
Dr. Farrell notified that a Posey Vest Restraint was placed on the patient. The patient was continuously trying to get out of his bed today after falling twice yesterday. Patient non-compliant with hospital staff after being instructed several times to remain in bed.

## 2025-02-24 NOTE — PHYSICAL THERAPY NOTE
PHYSICAL THERAPY EVALUATION - INPATIENT     Room Number: 316/316-A  Evaluation Date: 2/24/2025  Type of Evaluation: Initial  Physician Order: PT Eval and Treat    Presenting Problem: +falls  Co-Morbidities : HTN, CAD, prostate cancer,dementia  Reason for Therapy: Mobility Dysfunction and Discharge Planning    PHYSICAL THERAPY ASSESSMENT   Patient is a 89 year old male admitted 2/23/2025 for +fall.  Prior to admission, patient's baseline is mod ind with RW.  Patient is currently functioning below baseline with bed mobility, transfers, and gait.  Patient is requiring moderate assist and maximum assist as a result of the following impairments: decreased functional strength, decreased endurance/aerobic capacity, and impaired standing balance.  Physical Therapy will continue to follow for duration of hospitalization.    Patient will benefit from continued skilled PT Services to promote return to prior level of function and safety with continuous assistance and gradual rehabilitative therapy .    PLAN DURING HOSPITALIZATION  Nursing Mobility Recommendation : 1 Assist     PT Treatment Plan: Bed mobility;Body mechanics;Endurance;Energy conservation;Family education;Patient education;Gait training;Range of motion;Stair training;Transfer training;Balance training  Rehab Potential : Good  Frequency (Obs):  (2-3x/week)     CURRENT GOALS    Goal #1 Patient is able to demonstrate supine - sit EOB @ level: supervision     Goal #2 Patient is able to demonstrate transfers Sit to/from Stand at assistance level: supervision     Goal #3 Patient is able to ambulate 50 feet with assist device: walker - rolling at assistance level: supervision     Goal #4    Goal #5    Goal #6    Goal Comments: Goals established on 2/24/2025      PHYSICAL THERAPY MEDICAL/SOCIAL HISTORY  History related to current admission: Patient is a 89 year old male admitted on 2/23/2025 from [facility] for falls.  Pt diagnosed with +COVID, influenza.    HOME SITUATION  Type  of Home: Independent living facility ([facility])  Home Layout: One level                              Patient Regularly Uses: Rolling walker      Prior Level of [place]: Pt reports mod ind with RW PTA.      SUBJECTIVE  \"I have to go now!\"    OBJECTIVE  Precautions: Bed/chair alarm;Restraints  Fall Risk: High fall risk    WEIGHT BEARING RESTRICTION     PAIN ASSESSMENT  Ratin          COGNITION  Following Commands:  follows one step commands with repetition  Awareness of Deficits:  not aware of deficits    RANGE OF MOTION AND STRENGTH ASSESSMENT  Upper extremity ROM and strength are within functional limits     Lower extremity ROM is within functional limits     Lower extremity strength is within functional limits     BALANCE  Static Sitting: Poor +  Dynamic Sitting: Poor +  Static Standing: Poor  Dynamic Standing: Poor    ADDITIONAL TESTS                                    ACTIVITY TOLERANCE                         O2 WALK       NEUROLOGICAL FINDINGS                        AM-PAC '6-Clicks' INPATIENT SHORT FORM - BASIC MOBILITY  How much difficulty does the patient currently have...  Patient Difficulty: Turning over in bed (including adjusting bedclothes, sheets and blankets)?: A Little   Patient Difficulty: Sitting down on and standing up from a chair with arms (e.g., wheelchair, bedside commode, etc.): A Lot   Patient Difficulty: Moving from lying on back to sitting on the side of the bed?: A Lot   How much help from another person does the patient currently need...   Help from Another: Moving to and from a bed to a chair (including a wheelchair)?: A Lot   Help from Another: Need to walk in hospital room?: A Lot   Help from Another: Climbing 3-5 steps with a railing?: Total     AM-PAC Score:  Raw Score: 12   Approx Degree of Impairment: 68.66%   Standardized Score (AM-PAC Scale): 35.33   CMS Modifier (G-Code): CL    FUNCTIONAL ABILITY STATUS  Gait Assessment   Functional Mobility/Gait Assessment  Gait  Assistance: Moderate assistance  Distance (ft): 10ftx1;  2ftx1  Assistive Device: Rolling walker  Pattern:  (flexed posure, posterior lean)    Skilled Therapy Provided     Bed Mobility:  Rolling: min A  Supine to sit: min A   Sit to supine: min A     Transfer Mobility:  Sit to stand: mod A   Stand to sit: mod A  Gait = varying from min to mod A.    Therapist's Comments: Pt demos ambulation to toilet, cues to keep RW with self.  PCT present to assist with pericare from the toilet.  Chair brought closer for safety.  Stand pivot with RW from chair back to bed.  RN aware pt back in bed with posey in place.    Exercise/Education Provided:  Bed mobility  Body mechanics  Functional activity tolerated  Gait training  Transfer training    Patient End of Session: Up in chair;Needs met;RN aware of session/findings;Call light within reach;All patient questions and concerns addressed;Alarm set;Discussed recommendations with /      Patient Evaluation Complexity Level:  History Moderate - 1 or 2 personal factors and/or co-morbidities   Examination of body systems Moderate - addressing a total of 3 or more elements   Clinical Presentation  Moderate - Evolving   Clinical Decision Making Moderate Complexity       PT Session Time: 20 minutes    Therapeutic Activity: 12 minutes

## 2025-02-24 NOTE — CM/SW NOTE
02/24/25 1500   CM/SW Referral Data   Referral Source Physician   Reason for Referral Discharge planning   Informant EMR;Daughter   Medical Hx   Does patient have an established PCP? Yes  (Nelly Lozano MD)   Significant Past Medical/Mental Health Hx HLD, Dementia, anxiety   Patient Info   Advanced directives? Yes  (HCPOA dtr Lakisha Nicholas)   Patient's Current Mental Status at Time of Assessment Confused or unable to complete assessment   Patient's Home Environment Independent Living  ([facility])   Number of Levels in Home 1   Patient lives with Alone   Patient Status Prior to Admission   Independent with ADLs and Mobility No   Pt. requires assistance with Driving   Discharge Needs   Anticipated D/C needs Subacute rehab   Services Requested   Submitted to [facility] Yes   PASRR Level 1 Submitted Yes     Pt's dtr and HCPNACHO Banuelos called to complete assessment. Pt admitted from [facility], where he has lived in independent for some time. Pt had 2 falls prior to admission and per dtr, his home appearance indicates he needs more support at home. Pt's dtr already reached out to [facility] to increase his services to enhanced. Pt will be assigned a care giver when he returns. Pt is currently confused, requiring restraints for safety. PT evaluated pt and pt's anticipated therapy need for dc is for gradual rehab. UR contacted to review pt's status. Per UR, pt's status changed to inpatient today. Medicare guidelines for OMAR coverage reviewed w/ pt's dtr.     Pt's dtr agrees to receive OMAR list via email once available at [email or web address]. Pt's dtr is hesitant to visit at this time d/t pt's isolation and continued confusion. Dtr voiced that she is available via phone as needed.     Addendum: 1750: Currently no available OMAR facilities, additional referrals sent. CM/SW will need to follow on referral and share with dtr once pt has available facilities.     CM/SW will remain available for DC planning and/or support.      Christie Kelly, HECTORN, CMSRN    [id number]

## 2025-02-24 NOTE — PLAN OF CARE
Problem: Safety Risk - Non-Violent Restraints  Goal: Patient will remain free from self-harm  Description: INTERVENTIONS:  - Apply the least restrictive restraint to prevent harm  - Notify patient and family of reasons restraints applied  - Assess for any contributing factors to confusion (electrolyte disturbances, delirium, medications)  - Discontinue any unnecessary medical devices as soon as possible  - Assess the patient's physical comfort, circulation, skin condition, hydration, nutrition and elimination needs   - Reorient and redirection as needed  - Assess for the need to continue restraints  Outcome: Progressing     Problem: SAFETY ADULT - FALL  Goal: Free from fall injury  Description: INTERVENTIONS:  - Assess pt frequently for physical needs  - Identify cognitive and physical deficits and behaviors that affect risk of falls.  - [place] fall precautions as indicated by assessment.  - Educate pt/family on patient safety including physical limitations  - Instruct pt to call for assistance with activity based on assessment  - Modify environment to reduce risk of injury  - Provide assistive devices as appropriate  - Consider OT/PT consult to assist with strengthening/mobility  - Encourage toileting schedule  Outcome: Progressing     Problem: DISCHARGE PLANNING  Goal: Discharge to home or other facility with appropriate resources  Description: INTERVENTIONS:  - Identify barriers to discharge w/pt and caregiver  - Include patient/family/discharge partner in discharge planning  - Arrange for needed discharge resources and transportation as appropriate  - Identify discharge learning needs (meds, wound care, etc)  - Arrange for interpreters to assist at discharge as needed  - Consider post-discharge preferences of patient/family/discharge partner  - Complete POLST form as appropriate  - Assess patient's ability to be responsible for managing their own health  - Refer to Case Management Department for  coordinating discharge planning if the patient needs post-hospital services based on physician/LIP order or complex needs related to functional status, cognitive ability or social support system  Outcome: Progressing     Problem: PAIN - ADULT  Goal: Verbalizes/displays adequate comfort level or patient's stated pain goal  Description: INTERVENTIONS:  - Encourage pt to monitor pain and request assistance  - Assess pain using appropriate pain scale  - Administer analgesics based on type and severity of pain and evaluate response  - Implement non-pharmacological measures as appropriate and evaluate response  - Consider cultural and social influences on pain and pain management  - Manage/alleviate anxiety  - Utilize distraction and/or relaxation techniques  - Monitor for opioid side effects  - Notify MD/LIP if interventions unsuccessful or patient reports new pain  - Anticipate increased pain with activity and pre-medicate as appropriate  Outcome: Progressing     Problem: RISK FOR INFECTION - ADULT  Goal: Absence of fever/infection during anticipated neutropenic period  Description: INTERVENTIONS  - Monitor WBC  - Administer growth factors as ordered  - Implement neutropenic guidelines  Outcome: Progressing   Alert to self , oriented to place  disoriented to time . Very weak , posey on , bruises on b/l upper and lower extremities , poor appetite , incontintient of bowel and bladder  Plan of care discussed with family , patient is confused . Had a BM today . Will continue to monitor

## 2025-02-24 NOTE — PROGRESS NOTES
The patient's daughter and Power of , Lakisha Nicholas, was notified that the patient placed in a Posey Vest Restraint this afternoon. The patient was continuously trying to get out of his bed today after falling twice yesterday. He was non-compliant with hospital staff after being instructed several times to remain in bed.The patient's daughter verbalized her understanding.

## 2025-02-24 NOTE — PROGRESS NOTES
Admission skin check performed with Sobeida, Patient Care Technician. Non-blanchable redness noted to sacrum, and bruising to bilateral upper and lower extremities. Mepilex dressing applied to sacrum.

## 2025-02-24 NOTE — OCCUPATIONAL THERAPY NOTE
OCCUPATIONAL THERAPY EVALUATION - INPATIENT     Room Number: 316/316-A  Evaluation Date: 2/24/2025  Type of Evaluation: Initial  Presenting Problem: 2 falls, found on floor in bathroom at independent living facility, COVID and influenza    Physician Order: IP Consult to Occupational Therapy  Reason for Therapy: ADL/IADL Dysfunction and Discharge Planning    OCCUPATIONAL THERAPY ASSESSMENT   Patient is currently functioning below baseline with  all ADL . Prior to admission, patient's baseline is Modified independent at [facility] independent living.  Patient is requiring maximum assistance, mod A as a result of the following impairments: decreased functional strength, decreased endurance, impaired standing balance, impaired motor planning, decreased insight to deficits, and decreased safety awareness. Occupational Therapy will continue to follow for duration of hospitalization.    Patient will benefit from continued skilled OT Services to promote return to prior level of function and safety with continuous assistance and gradual rehabilitative therapy    History Related to Current Admission: Patient is a 89 year old male admitted on 2/23/2025 with Presenting Problem: 2 falls, found on floor in bathroom at independent living facility, COVID and influenza. Co-Morbidities : HTN, CAD, prostate cancer,dementia    EVALUATION SESSION:  Patient Start of Session: supine    FUNCTIONAL TRANSFER ASSESSMENT  Sit to Stand: Edge of Bed  Edge of Bed: Moderate Assist  Toilet Transfer: Moderate Assist    BED MOBILITY  Supine to Sit : Moderate Assist  Sit to Supine (OT): Moderate Assist    COGNITION  Following Commands:  follows one step commands with increased time and follows one step commands with repetition  Initiation: hand over hand to initiate tasks  Motor Planning: impaired  Safety Judgement:  decreased awareness of need for assistance and decreased awareness of need for safety  Awareness of Errors:  assistance required to  identify errors made, assistance required to correct errors made, and decreased awareness of errors   Awareness of Deficits:  not aware of deficits  Problem Solving:  assistance required to identify errors made    Upper Extremity   ROM: within functional limits   Strength: within functional limits     EDUCATION PROVIDED  Patient Education : Role of Occupational Therapy; Plan of Care; Functional Transfer Techniques; Fall Prevention; Posture/Positioning  Patient's Response to Education: Requires Further Education    Equipment used: rw     Therapist comments: Upon entering the room, patient told OT and PT, \"I need to have a bowel movement.\"   Mod A supine to sit. Increased time and cueing provided to initiate standing from edge of bed, mod A to stand, ambulated to bathroom with RW and PT, stand to sit toilet transfer max A.   OT provided max A with brief management.  Refer to PT note about gait.   Mod A sit to stand from toilet, hand-over-hand guidance for safe grab bar uses, total A by PCT for hygiene care, and total A for brief management while standing. Mod A static standing balance. Chair was brought to just outside of the bathroom, increased cueing and time to take steps from toilet to the chair.  Informed RN about the bedside commode use recommendation.  Max A sit to supine. Placed vest restraint and alarm back on.    Patient End of Session: In bed;Needs met;Call light within reach;RN aware of session/findings;All patient questions and concerns addressed;Restraints;Alarm set    OCCUPATIONAL PROFILE    HOME SITUATION  Type of Home: Independent living facility ([facility])  Home Layout: One level       Toilet and Equipment: Comfort height toilet;Grab bar  Shower/Tub and Equipment: Walk-in shower;Shower chair;Grab bar                Patient Regularly Uses: Rolling walker    Prior Level of Function: [facility] independent living, per pt, Modified independent  With ADL, uses RW to walk to dining room.      PAIN  ASSESSMENT  Ratin          OBJECTIVE  Precautions: Bed/chair alarm;Restraints  Fall Risk: High fall risk    ASSESSMENTS    AM-PAC ‘6-Clicks’ Inpatient Daily Activity Short Form  -   Putting on and taking off regular lower body clothing?: A Lot  -   Bathing (including washing, rinsing, drying)?: A Lot  -   Toileting, which includes using toilet, bedpan or urinal? : A Lot  -   Putting on and taking off regular upper body clothing?: A Little  -   Taking care of personal grooming such as brushing teeth?: A Little  -   Eating meals?: A Little    AM-PAC Score:  Score: 15  Approx Degree of Impairment: 56.46%  Standardized Score (AM-PAC Scale): 34.69    ADDITIONAL TESTS     NEUROLOGICAL FINDINGS      COGNITION ASSESSMENTS     PLAN     OT Treatment Plan: Balance activities;Energy conservation/work simplification techniques;ADL training;Functional transfer training;Patient/Family education;Patient/Family training;Equipment eval/education;Compensatory technique education  Rehab Potential : Fair  Frequency: 3x/week  Number of Visits to Meet Established Goals: 5    ADL Goals   Patient will perform grooming: with setup  Patient will perform upper body dressing:  with supervision  Patient will perform lower body dressing:  with min assist  Patient will perform toileting: with min assist    Functional Transfer Goals  Patient will transfer to toilet:  CGA    UE Exercise Program Goal  Patient will be supervision with bilateral AROM HEP (home exercise program).    Patient Evaluation Complexity Level:   Occupational Profile/Medical History LOW - Brief history including review of medical or therapy records    Specific performance deficits impacting engagement in ADL/IADL MODERATE  3 - 5 performance deficits   Client Assessment/Performance Deficits MODERATE - Comorbidities and min to mod modifications of tasks    Clinical Decision Making LOW - Analysis of occupational profile, problem-focused assessments, limited treatment options     Overall Complexity LOW     OT Session Time: 23 minutes  Self-Care Home Management: 10 minutes

## 2025-02-24 NOTE — PROGRESS NOTES
[facility]   part of [facility] Hospitalist Progress Note     Merrick Stephenson Patient Status:  Observation    10/24/1928 MRN [id number]   Location [facility] 3NW-A Attending Demian Farrell,    Hosp Day # 0 PCP Nelly Miranda MD     Assessment & Plan:    A/P: 89 y/o M w/ PMHx of HLD, Dementia who presents to the hospital after multiple falls in the past 1-2 weeks     Recurrent Falls  Possible contribution of 2 viral infections  Hx of Dementia/Cognitive Decline  Plan:  - PT/OT evaluations pending, anticipate patient will need rehab/memory care placement, SW consulted  - Do not suspect additional testing will provide any further benefit    Hospital Delirium  Hx of Dementia  Plan:  - Seroquel nightly  - Posey PRN  - Melatonin nightly     Influenza A Infection  COVID19  Plan:  - Continue Tamiflu  - No advanced COVID therapies needed     HTN  - Verapamil     HLD  - Statin    DVT Ppx: SQH    Subjective:     Patient seen and examined this morning at bedside. Remains delirious. No pain complaints.    Vital signs:  Temp:  [97.2 °F (36.2 °C)-98.3 °F (36.8 °C)] 98.3 °F (36.8 °C)  Pulse:  [63-86] 63  Resp:  [18-20] 18  BP: (126-190)/(54-99) 141/64  SpO2:  [97 %-100 %] 98 %    Physical Exam:    General: AAOx0-1  Respiratory: Clear to auscultation bilaterally. No wheezes.  Cardiovascular: S1, S2. Regular rate and rhythm  Abdomen: Soft, nontender, nondistended.  Positive bowel sounds. No rebound or guarding.  Extremities: No edema.  Neuro:  Grossly non focal, no motor deficits.      Diagnostic Data:    Pertinent Labs and Imaging independently reviewed  Comments:  Cr normalized    Demian Farrell D.O.  [facility] Hospitalist     Supplementary Documentation:   DVT Mechanical Prophylaxis:   SCDs,    DVT Pharmacologic Prophylaxis   Medication    heparin (Porcine) 5000 UNIT/ML injection 5,000 Units                Code Status: DNAR/Comfort Care  Perez: No urinary catheter in place  Perez  Duration (in days):   Central line:    KRISTYN:

## 2025-02-25 LAB — MAGNESIUM SERPL-MCNC: 1.9 MG/DL (ref 1.6–2.6)

## 2025-02-25 NOTE — PROGRESS NOTES
[facility]   part of [facility] Hospitalist Progress Note     Merrick Stephenson Patient Status:  Observation    10/24/1928 MRN [id number]   Location [facility] 3NW-A Attending Demian Farrell,    Hosp Day # 1 PCP Nelly Miranda MD     Assessment & Plan:    A/P: 89 y/o M w/ PMHx of HLD, Dementia who presents to the hospital after multiple falls in the past 1-2 weeks     Recurrent Falls  Possible contribution of 2 viral infections  Hx of Dementia/Cognitive Decline  Plan:  - PT/OT evaluations recommending OMAR, SW to assist w/ placement  - Do not suspect additional testing will provide any further benefit    Hospital Delirium  Hx of Dementia  Plan:  - Seroquel nightly  - Posey PRN  - Melatonin nightly     Influenza A Infection  COVID19  Plan:  - Continue Tamiflu  - No advanced COVID therapies needed     HTN  - Verapamil     HLD  - Statin    DVT Ppx: SQH    Subjective:     Patient seen and examined this morning at bedside. More alert today but still slightly confused. Trying to get out of bed still.    Vital signs:  Temp:  [97.9 °F (36.6 °C)-98.4 °F (36.9 °C)] 98.4 °F (36.9 °C)  Pulse:  [68-78] 75  Resp:  [18-20] 18  BP: (142-173)/(66-83) 142/66  SpO2:  [90 %-100 %] 98 %    Physical Exam:    General: AAOx1-2  Respiratory: Clear to auscultation bilaterally. No wheezes.  Cardiovascular: S1, S2. Regular rate and rhythm  Abdomen: Soft, nontender, nondistended.  Positive bowel sounds. No rebound or guarding.  Extremities: No edema.  Neuro:  Grossly non focal, no motor deficits.      Diagnostic Data:    Pertinent Labs and Imaging independently reviewed  Comments:  None    Demian Farrell D.O.  [facility] Hospitalist     Supplementary Documentation:   DVT Mechanical Prophylaxis:   SCDs,    DVT Pharmacologic Prophylaxis   Medication    heparin (Porcine) 5000 UNIT/ML injection 5,000 Units                Code Status: DNAR/Comfort Care  Perez: No urinary catheter in place  Perez Duration  (in days):   Central line:    KRISTYN:

## 2025-02-25 NOTE — CONGREGATE LIVING REVIEW
[facility] Living Authorization    The [facility] Review Committee has reviewed this case and the patient IS APPROVED for discharge to a facility for Short Term Skilled once the following procedure is followed:     - The physician discharge instructions (contained within the ALTAF note for SNF) must inlcude the below appropriate and approved COVID instructions to the facility    For questions regarding CLRC approval process, please contact the CM assigned to the case.  For questions regarding RN discharge workflow, please contact the unit Clinical Leader.

## 2025-02-25 NOTE — PLAN OF CARE
Problem: Safety Risk - Non-Violent Restraints  Goal: Patient will remain free from self-harm  Description: INTERVENTIONS:  - Apply the least restrictive restraint to prevent harm  - Notify patient and family of reasons restraints applied  - Assess for any contributing factors to confusion (electrolyte disturbances, delirium, medications)  - Discontinue any unnecessary medical devices as soon as possible  - Assess the patient's physical comfort, circulation, skin condition, hydration, nutrition and elimination needs   - Reorient and redirection as needed  - Assess for the need to continue restraints  Outcome: Progressing     Problem: SAFETY ADULT - FALL  Goal: Free from fall injury  Description: INTERVENTIONS:  - Assess pt frequently for physical needs  - Identify cognitive and physical deficits and behaviors that affect risk of falls.  - [place] fall precautions as indicated by assessment.  - Educate pt/family on patient safety including physical limitations  - Instruct pt to call for assistance with activity based on assessment  - Modify environment to reduce risk of injury  - Provide assistive devices as appropriate  - Consider OT/PT consult to assist with strengthening/mobility  - Encourage toileting schedule  Outcome: Progressing     Problem: DISCHARGE PLANNING  Goal: Discharge to home or other facility with appropriate resources  Description: INTERVENTIONS:  - Identify barriers to discharge w/pt and caregiver  - Include patient/family/discharge partner in discharge planning  - Arrange for needed discharge resources and transportation as appropriate  - Identify discharge learning needs (meds, wound care, etc)  - Arrange for interpreters to assist at discharge as needed  - Consider post-discharge preferences of patient/family/discharge partner  - Complete POLST form as appropriate  - Assess patient's ability to be responsible for managing their own health  - Refer to Case Management Department for  coordinating discharge planning if the patient needs post-hospital services based on physician/LIP order or complex needs related to functional status, cognitive ability or social support system  Outcome: Progressing     Problem: PAIN - ADULT  Goal: Verbalizes/displays adequate comfort level or patient's stated pain goal  Description: INTERVENTIONS:  - Encourage pt to monitor pain and request assistance  - Assess pain using appropriate pain scale  - Administer analgesics based on type and severity of pain and evaluate response  - Implement non-pharmacological measures as appropriate and evaluate response  - Consider cultural and social influences on pain and pain management  - Manage/alleviate anxiety  - Utilize distraction and/or relaxation techniques  - Monitor for opioid side effects  - Notify MD/LIP if interventions unsuccessful or patient reports new pain  - Anticipate increased pain with activity and pre-medicate as appropriate  Outcome: Progressing     Problem: RISK FOR INFECTION - ADULT  Goal: Absence of fever/infection during anticipated neutropenic period  Description: INTERVENTIONS  - Monitor WBC  - Administer growth factors as ordered  - Implement neutropenic guidelines  Outcome: Progressing   More alert and orient ed this morning , oriented to place and person , disoriented to time and situation , very weak , unable to eat by self , assisted to eat food , bruises on b/l upper and lower extremities due to fall . Incontinent of bladder and bowel . Brief on . Mepilex on the coccyx , redness on the rectal area. Barrier cream applied . Posey on . Plan of care discussed with family , will continue to monitor

## 2025-02-25 NOTE — PROGRESS NOTES
A&Ox2. RA. VSS.  Posey vest is in place- documentation per order.   Denies pain, nausea and SOB.   Safety measures in place, all questions and concerns addressed. Call light within reach.

## 2025-02-25 NOTE — CM/SW NOTE
Dtr Lakisha called back to say that they have decided on [place] for OMAR.  [facility] reserved.  PASRR letter uploaded in aidin.  Await medical clearance.

## 2025-02-25 NOTE — DIETARY NOTE
[facility]   part of [facility]    NUTRITION ASSESSMENT    Unable to diagnose malnutrition criteria at this time.      NUTRITION INTERVENTION:    Meal and Snacks - Monitor and encourage adequate PO intake.   Medical Food Supplements - Magic Cup BID. Rationale/use for oral supplements discussed.      PATIENT STATUS: 02/25/25 Consult received for \"malnutrition\". Chart reviewed, 89 y/o patient admitted after a fall, recent diagnosis of influenza and COVID. PMH sig for dementia. Pt visited, resting in bed. Pt with lunch tray at bedside, pt stated he is not hungry at the moment. Pt with varying intakes since admit (0-75% of meals, ordering 3 meals per day). Encouraged pt to eat small, frequent meals to help meet nutritional needs when appetite is low. Offered to order ONS for pt, pt likes ice cream so will try Magic cup. Will order for him BID. Answered all questions at this time. Will follow per protocol.      ANTHROPOMETRICS:  Ht: 170.2 cm (5' 7\")  Wt: 65.8 kg (145 lb).   BMI: Body mass index is 22.71 kg/m².  IBW: 67 kg      WEIGHT HISTORY:   Weight loss: Yes, Non-severe Wt loss of 10 lbs, 5%, over 1 year     Wt Readings from Last 10 Encounters:   02/23/25 65.8 kg (145 lb)   01/10/25 66.7 kg (147 lb)   01/03/24 70.3 kg (155 lb)   02/13/23 74.4 kg (164 lb 0.4 oz)   10/24/22 74.4 kg (164 lb)   03/02/22 71.7 kg (158 lb)   11/17/21 71.7 kg (158 lb)   06/30/21 72.5 kg (159 lb 12.8 oz)   02/24/21 76.2 kg (168 lb)   02/03/21 79.4 kg (175 lb)        NUTRITION:  Diet:       Procedures    Cardiac diet Cardiac; Is Patient on Accuchecks? No      Food Allergies: No  Cultural/Ethnic/Advent Preferences Addressed: Yes    Percent Meals Eaten (last 3 days)       Date/Time Percent Meals Eaten (%)    02/24/25 1127 25 %    02/24/25 1500 25 %    02/24/25 2000 5 %    02/25/25 0642 0 %    02/25/25 0800 25 %    02/25/25 0920 75 %            GI system review: WNL Last BM Date: 02/23/25  Skin and wounds: intact    NUTRITION RELATED  PHYSICAL FINDINGS:     1. Body Fat/Muscle Mass: mild depletion body fat Triceps and mild muscle depletion Temple region and Thigh region     2. Fluid Accumulation: none per RN documentation    NUTRITION PRESCRIPTION:  65.8 kg Actual Body Weight  Calories: 1650 - 1975 calories/day (25-30 kcal/kg)  Protein: 66 - 99 grams protein/day (1.0-1.5 grams protein per kg)  Fluid: ~1 ml/kcal or per MD discretion    NUTRITION DIAGNOSIS/PROBLEM:  Predicted suboptimal energy intake related to insufficient appetite resulting in inadequate nutrition intake as evidenced by documented/reported insufficient oral intake      MONITOR AND EVALUATE/NUTRITION GOALS:  PO intake of 75% of meals TID - New  PO intake of 75% of oral nutrition supplement/s - New  Weight stable within 1 to 2 lbs during admission - New      MEDICATIONS:  Reviewed    LABS:  Reviewed    Pt is at Moderate nutrition risk      Marilee Vaca, MS, RDN, LDN  Clinical Dietitian

## 2025-02-25 NOTE — CM/SW NOTE
Spoke with dtr Lakisha about OMAR and emailed her the OMAR list.  She will choose a OMAR for pt and let us know.  Pt will need 3 inpt midnights to qualify for OMAR under medicare which would mean that pt would not be able to be dc'd until Thurs 2/27.

## 2025-02-26 LAB
ANION GAP SERPL CALC-SCNC: 4 MMOL/L (ref 0–18)
BUN BLD-MCNC: 27 MG/DL (ref 9–23)
CALCIUM BLD-MCNC: 9.1 MG/DL (ref 8.7–10.6)
CHLORIDE SERPL-SCNC: 107 MMOL/L (ref 98–112)
CO2 SERPL-SCNC: 28 MMOL/L (ref 21–32)
CREAT BLD-MCNC: 1.1 MG/DL
EGFRCR SERPLBLD CKD-EPI 2021: 61 ML/MIN/1.73M2 (ref 60–?)
ERYTHROCYTE [DISTWIDTH] IN BLOOD BY AUTOMATED COUNT: 12.4 %
GLUCOSE BLD-MCNC: 92 MG/DL (ref 70–99)
HCT VFR BLD AUTO: 34.9 %
HGB BLD-MCNC: 11.7 G/DL
MCH RBC QN AUTO: 30.3 PG (ref 26–34)
MCHC RBC AUTO-ENTMCNC: 33.5 G/DL (ref 31–37)
MCV RBC AUTO: 90.4 FL
OSMOLALITY SERPL CALC.SUM OF ELEC: 293 MOSM/KG (ref 275–295)
PLATELET # BLD AUTO: 109 10(3)UL (ref 150–450)
POTASSIUM SERPL-SCNC: 3.9 MMOL/L (ref 3.5–5.1)
RBC # BLD AUTO: 3.86 X10(6)UL
SODIUM SERPL-SCNC: 139 MMOL/L (ref 136–145)
WBC # BLD AUTO: 5 X10(3) UL (ref 4–11)

## 2025-02-26 NOTE — CM/SW NOTE
Pt on restraints - will need to be restraint free for 24 hrs before he can dc to [facility] for OMAR.

## 2025-02-26 NOTE — PROGRESS NOTES
[facility]   part of [facility] Hospitalist Progress Note     Merrick Stephenson Patient Status:  Observation    10/24/1928 MRN [id number]   Location [facility] 3NW-A Attending Demian Farrell,    Hosp Day # 2 PCP Nelly Miranda MD     Assessment & Plan:    A/P: 89 y/o M w/ PMHx of HLD, Dementia who presents to the hospital after multiple falls in the past 1-2 weeks     Recurrent Falls  Possible contribution of 2 viral infections  Hx of Dementia/Cognitive Decline  Plan:  - PT/OT evaluations recommending OMAR, SW to assist w/ placement  - Do not suspect additional testing will provide any further benefit    Hospital Delirium  Hx of Dementia  Plan:  - Seroquel nightly  - Posey PRN  - Melatonin nightly     Influenza A Infection  COVID19  Plan:  - Continue Tamiflu  - No advanced COVID therapies needed     HTN  - Verapamil     HLD  - Statin    DVT Ppx: SQH    Subjective:     Patient seen and examined this morning at bedside. Working w/ PT. No overnight complaints. Pending placement and needs to be restraint free for 24 hours    Vital signs:  Temp:  [97.6 °F (36.4 °C)-98.6 °F (37 °C)] 98.6 °F (37 °C)  Pulse:  [57-70] 65  Resp:  [18-20] 18  BP: (137-168)/(57-67) 139/66  SpO2:  [96 %] 96 %    Physical Exam:    General: AAOx1-2  Respiratory: Clear to auscultation bilaterally. No wheezes.  Cardiovascular: S1, S2. Regular rate and rhythm  Abdomen: Soft, nontender, nondistended.  Positive bowel sounds. No rebound or guarding.  Extremities: No edema.  Neuro:  Grossly non focal, no motor deficits.      Diagnostic Data:    Pertinent Labs and Imaging independently reviewed  Comments:  stable    Demian Farrell D.O.  [facility] Hospitalist     Supplementary Documentation:   DVT Mechanical Prophylaxis:   SCDs,    DVT Pharmacologic Prophylaxis   Medication    heparin (Porcine) 5000 UNIT/ML injection 5,000 Units                Code Status: DNAR/Comfort Care  Perez: No urinary catheter in  place  Perez Duration (in days):   Central line:    KRISTYN: 2/27/2025

## 2025-02-26 NOTE — PROGRESS NOTES
A&Ox2. RA. VSS.   Dalia vest in place- charting per order .   Denies pain, nausea and SOB.   Safety measures in place. All questions and concerns addressed. Call light within reach- frequent rounding performed.

## 2025-02-27 ENCOUNTER — APPOINTMENT (OUTPATIENT)
Dept: CT IMAGING | Facility: HOSPITAL | Age: OVER 89
DRG: 179 | End: 2025-02-27
Attending: STUDENT IN AN ORGANIZED HEALTH CARE EDUCATION/TRAINING PROGRAM
Payer: MEDICARE

## 2025-02-27 ENCOUNTER — NURSE ONLY (OUTPATIENT)
Dept: ELECTROPHYSIOLOGY | Facility: HOSPITAL | Age: OVER 89
End: 2025-02-27
Attending: Other
Payer: MEDICARE

## 2025-02-27 ENCOUNTER — APPOINTMENT (OUTPATIENT)
Dept: CT IMAGING | Facility: HOSPITAL | Age: OVER 89
End: 2025-02-27
Attending: STUDENT IN AN ORGANIZED HEALTH CARE EDUCATION/TRAINING PROGRAM
Payer: MEDICARE

## 2025-02-27 PROBLEM — R40.4 TRANSIENT ALTERATION OF AWARENESS: Status: ACTIVE | Noted: 2025-02-27

## 2025-02-27 PROBLEM — R56.9 CONVULSIONS (HCC): Status: ACTIVE | Noted: 2025-02-27

## 2025-02-27 LAB — GLUCOSE BLD-MCNC: 117 MG/DL (ref 70–99)

## 2025-02-27 PROCEDURE — 70450 CT HEAD/BRAIN W/O DYE: CPT | Performed by: STUDENT IN AN ORGANIZED HEALTH CARE EDUCATION/TRAINING PROGRAM

## 2025-02-27 PROCEDURE — 95720 EEG PHY/QHP EA INCR W/VEEG: CPT | Performed by: OTHER

## 2025-02-27 PROCEDURE — 99223 1ST HOSP IP/OBS HIGH 75: CPT | Performed by: OTHER

## 2025-02-27 RX ORDER — HYDRALAZINE HYDROCHLORIDE 10 MG/1
10 TABLET, FILM COATED ORAL EVERY 6 HOURS PRN
Status: DISCONTINUED | OUTPATIENT
Start: 2025-02-27 | End: 2025-02-28

## 2025-02-27 NOTE — CONSULTS
[facility]  [facility]rology Consult Note    Merrick Stephenson Patient Status:  Inpatient    10/24/1928 MRN [id number]   Location [facility] 3NW-A Attending Stevenson Lane, DO   Hosp Day # 3 PCP Nelly Miranda MD     REASON FOR EVALUATION:  Seizure-like episode    HISTORY OF PRESENT ILLNESS:  Mr. Stephenson is a 89-year-old man with a history of hypertension, dyslipidemia, diabetes mellitus and dementia who was admitted to the hospital for falls.  He was found to have co-infection of COVID and influenza.  He has been making expected progress with supportive care.  Today, after working with physical therapy, he was apparently sat down back on the bed and it looked as if his \"eyes rolled up into his head\" according to the bedside nurse.  The physical therapist reported \"shaking all over\" concerning for seizure activity but this was not witnessed by the nurse.  A rapid was called and by the time of that evaluation the patient was back at his baseline.  He has no memory of this and thinks everything is fine.  He is a limited historian due to dementia.    PAST MEDICAL HISTORY:  Past Medical History:    Abdominal aneurysm    2.59cm sagittal,, 2.55 x3.23 cm transverse    Allergic rhinitis    Anxiety    Arthritis    Atypical chest pain    Benign mole    Closed angle glaucoma    L eye, s/p laser    Colon polyps    Depression    Diverticulosis    GERD (gastroesophageal reflux disease)    High blood pressure    High cholesterol    HTN (hypertension)    Hypercholesterolemia    mild high cholesterol, low HDL    Hyperglycemia    Internal hemorrhoids    Memory loss    Migraine headache with aura    Myalgia    Nasal polyps    Olecranon bursitis    Panic attack    Prostate cancer (HCC)    prostate cancer/surgery    Reflux esophagitis    Tubular adenoma    hyperplastic polyp    Uses hearing aid       PAST SURGICAL HISTORY:  Past Surgical History:   Procedure Laterality Date    Appendectomy      Cataract  august and 2015     Laparoscopy, surgical prostatectomy, retropubic radical, w/nerve sparing  5/1992    radical prostectomy    Laser surgery of eye      Nasal surg proc unlisted  1/1999    remove nasal polyps    Other surgical history  07/2020    Aortic aneurysm stent placed       FAMILY HISTORY:  family history includes Arthritis in his daughter; Asthma in his daughter; Diabetes in his mother; Heart Attack in his father; Heart Disorder in his mother; Hypertension in his father and mother; Pacemaker in his mother.    SOCIAL HISTORY:   reports that he has quit smoking. He has never been exposed to tobacco smoke. He has never used smokeless tobacco. He reports current alcohol use. He reports that he does not use drugs.    ALLERGIES:  Allergies   Allergen Reactions    Pcn [Penicillins] RASH       MEDICATIONS:  No current outpatient medications on file.     Current Facility-Administered Medications   Medication Dose Route Frequency    hydrALAZINE (Apresoline) tab 10 mg  10 mg Oral Q6H PRN    acetaminophen (Tylenol Extra Strength) tab 500 mg  500 mg Oral Q4H PRN    atorvastatin (Lipitor) tab 40 mg  40 mg Oral Daily    donepezil (Aricept) tab 10 mg  10 mg Oral Nightly    latanoprost (Xalatan) 0.005 % ophthalmic solution 1 drop  1 drop Ophthalmic Daily    heparin (Porcine) 5000 UNIT/ML injection 5,000 Units  5,000 Units Subcutaneous Q8H JUANY    verapamil ER (Calan-SR) tab 120 mg  120 mg Oral Daily    PARoxetine (Paxil) tab 20 mg  20 mg Oral Daily    [Held by provider] QUEtiapine (SEROquel) tab 25 mg  25 mg Oral Nightly    melatonin tab 3 mg  3 mg Oral Nightly       REVIEW OF SYSTEMS:  A 10-point system was reviewed.  Pertinent positives and negatives are noted in HPI.      PHYSICAL EXAMINATION:  VITAL SIGNS: /57 (BP Location: Left arm)   Pulse 70   Temp 97.7 °F (36.5 °C)   Resp 18   Ht 67\"   Wt 145 lb (65.8 kg)   SpO2 99%   BMI 22.71 kg/m²   GENERAL:  Patient is a 89 year old male in no acute distress.    NEUROLOGICAL:   Mental  status: Oriented to person, not place or time  Speech & Language: Fluent, no dysarthria. Speaks in non sequitur  Cranial Nerves: face symmetric, tongue midline, shoulder shrug equal  Motor: He does not participate in a motor examination but moves all limbs without lateralizing asymmetry  Tendon Reflexes: normal for habitus, no asymmetry  Gait: Deferred    DIAGNOSTIC DATA:  Labs:  Recent Labs   Lab 02/23/25  0036 02/24/25  0510 02/26/25  0817   RBC 3.92 3.96 3.86   HGB 11.9* 12.1* 11.7*   HCT 35.4* 38.5* 34.9*   MCV 90.3 97.2 90.4   MCH 30.4 30.6 30.3   MCHC 33.6 31.4 33.5   RDW 12.7 12.5 12.4   NEPRELIM 3.20  --   --    WBC 5.9 4.6 5.0   .0* 129.0* 109.0*         Recent Labs   Lab 02/23/25 0036 02/24/25  0510 02/26/25  0817   * 80 92   BUN 31* 17 27*   CREATSERUM 1.36* 1.06 1.10   EGFRCR 48* 64 61   CA 9.0 9.0 9.1    138 139   K 4.5 4.7 3.9    107 107   CO2 26.0 22.0 28.0         IMAGING:  CT BRAIN OR HEAD (CPT=70450)    Result Date: 2/27/2025  PROCEDURE:  CT BRAIN OR HEAD (65616)  COMPARISON:  [facility] , CT, CT BRAIN OR HEAD (65660), 2/23/2025, 1:55 AM.  INDICATIONS:  concern for seizure  TECHNIQUE:  Noncontrast CT scanning is performed through the brain. Dose reduction techniques were used. Dose information is transmitted to the ACR (American College of Radiology) NRDR (National Radiology Data Registry) which includes the Dose Index Registry.  PATIENT STATED HISTORY: (As transcribed by Technologist)  Patient altered mental status, possible seizure.    FINDINGS: No evidence of intracranial hemorrhage or extra-axial fluid collection. Lucencies in the deep periventricular white matter are likely sequelae of chronic small vessel ischemic disease. Prominence of the sulci is noted. No mass effect. Mild to moderate mucoperiosteal thickening of the paranasal sinuses.  The Visualized portions of the mastoid air cells are unremarkable. Visualized portions of the orbits are unremarkable. IMPRESSION:  Global parenchymal volume loss is noted.  Sequelae of chronic small vessel ischemic disease is noted. No evidence of intracranial hemorrhage or extra-axial fluid collection.    LOCATION:  [facility]   Dictated by (CST): Júnior Newell MD on 2/27/2025 at 1:53 PM     Finalized by (CST): Júnior Newell MD on 2/27/2025 at 1:57 PM       CT BRAIN OR HEAD (CPT=70450)    Result Date: 2/23/2025  CONCLUSION:  No acute intracranial abnormality identified.  Mild age-indeterminate microvascular ischemic changes in the cerebral white matter. If there is clinical concern for acute ischemia/infarction, an MRI of the brain would be recommended for further evaluation.  A preliminary report was provided by the [facility] teleradiology service.   LOCATION:  [facility]   Dictated by (CST): Bethel Rodriguez MD on 2/23/2025 at 7:22 AM     Finalized by (CST): Bethel Rodriguez MD on 2/23/2025 at 7:23 AM             ASSESSMENT:  Mr. Stephenson is a 89-year-old man with dementia and recent influenza and COVID co-infection who likely experienced orthostasis and possibly convulsive syncope, less likely epilepsy.    PLAN:  Principal Problem:    Fall, initial encounter  Active Problems:    Hyperglycemia    Thrombocytopenia (HCC)    Anemia    Azotemia    COVID-19    Flu  Orthostatic vital signs, heart rate and blood pressure.    Routine EEG.  Very low suspicion for epilepsy, no indication for antiepileptics at this point.    Continue supportive care.  We are following.    Sanjiv Aguilar MD

## 2025-02-27 NOTE — PROGRESS NOTES
Patient A&Ox2, RA, bedbound. Saline locked. Denies pain. Voiding via primofit. Incontinent of bowel. BM today. Tolerating diet. Plan of care discussed w/ patient's daughter via phone call.

## 2025-02-27 NOTE — PROGRESS NOTES
Alert and oriented X 2. Confused at times.   Contact and droplet isolation in place. Restraints Dcd yesterday. Seroquil on hold.   Rapid response called due to therapy working with patient. Patient with possible seizure.   Primo fit in place.  Awaiting results of EGD.  Safety precautions in place.  Scattered bruising to BLUE, redness noted to bilateral knees, and redness noted to sacrum and anus. Mepilex applied.   Call light in place.

## 2025-02-27 NOTE — PHYSICAL THERAPY NOTE
PHYSICAL THERAPY TREATMENT NOTE - INPATIENT    Room Number: 316/316-A     Session: 1     Number of Visits to Meet Established Goals: 3    Presenting Problem: +falls  Co-Morbidities : HTN, CAD, prostate cancer,dementia    PHYSICAL THERAPY ASSESSMENT   Patient demonstrates limited progress this session, goals  updated to reflect patient performance.      Patient is requiring moderate assist as a result of the following impairments: decreased functional strength, decreased endurance/aerobic capacity, pain, impaired dynamic and static sitting and standing balance, impaired coordination, impaired motor planning, medical status, and poor depth perception with difficulty keeping eyes open .     Patient continues to function below baseline with bed mobility, transfers, and gait.  Next session anticipate patient to progress bed mobility, transfers, and gait.  Physical Therapy will continue to follow patient for duration of hospitalization.    Patient continues to benefit from continued skilled PT services: to promote return to prior level of function and safety with continuous assistance and gradual rehabilitative therapy .    PLAN DURING HOSPITALIZATION  Nursing Mobility Recommendation : Lift Equipment     PT Treatment Plan: Bed mobility;Body mechanics;Endurance;Energy conservation;Family education;Patient education;Gait training;Range of motion;Stair training;Transfer training;Balance training  Frequency (Obs):  (2-3x/week)     CURRENT GOALS     Goal #1 Patient is able to demonstrate supine - sit EOB @ level: supervision     Goal #2 Patient is able to demonstrate transfers Sit to/from Stand at assistance level: supervision     Goal #3 Patient is able to ambulate 50 feet with assist device: walker - rolling at assistance level: supervision     Goal #4    Goal #5    Goal #6    Goal Comments: Goals established on 2/24/2025 2/27/2025 all goals ongoing.    SUBJECTIVE  \" My leg hurts.\"    OBJECTIVE  Precautions: Bed/chair  alarm;Restraints    WEIGHT BEARING RESTRICTION     PAIN ASSESSMENT   Rating: Unable to rate  Location: L leg  Management Techniques: Repositioning    BALANCE                                                                                                                       Static Sitting: Poor +  Dynamic Sitting: Poor +           Static Standing: Poor  Dynamic Standing: Poor    ACTIVITY TOLERANCE                         O2 WALK       AM-PAC '6-Clicks' INPATIENT SHORT FORM - BASIC MOBILITY  How much difficulty does the patient currently have...  Patient Difficulty: Turning over in bed (including adjusting bedclothes, sheets and blankets)?: A Little   Patient Difficulty: Sitting down on and standing up from a chair with arms (e.g., wheelchair, bedside commode, etc.): A Lot   Patient Difficulty: Moving from lying on back to sitting on the side of the bed?: A Lot   How much help from another person does the patient currently need...   Help from Another: Moving to and from a bed to a chair (including a wheelchair)?: Total   Help from Another: Need to walk in hospital room?: Total   Help from Another: Climbing 3-5 steps with a railing?: Total     AM-PAC Score:  Raw Score: 10   Approx Degree of Impairment: 76.75%   Standardized Score (AM-PAC Scale): 32.29   CMS Modifier (G-Code): CL    FUNCTIONAL ABILITY STATUS  Gait Assessment   Functional Mobility/Gait Assessment  Gait Assistance: Moderate assistance  Distance (ft): 2 side steps (second time noted scissoring gait during trial of stand pivot)  Assistive Device: Rolling walker  Pattern:  (shuffle)    Skilled Therapy Provided  Sitting balance training, lateral lean to R elbow and push off to return to midline.  Standing balance training.  Strength training  Education on midline orientation to prevent from L lean, keeping eyes open and worked on depth perception of tactile touch to different objects at different height and location.  Bed Mobility:  Rolling: To R with min A  to reach the rail, poor  of L and R.    Supine<>Sit: mod assist for trunk elevation from flat bed. Patient needed cues and manual guidance and hand over hand guidance for sequencing.    Sit<>Supine: max A     Transfer Mobility:  Sit<>Stand: mod A and RW, repeated three times.    Stand<>Sit: Mod A   Gait: RW and two side steps with RLE and mod assist on first trial. Second time, patient unable to step sequence and began to scissor steps.     Therapist's Comments: patient required frequent cues to keep eyes open throughout session every few min. Patient noted to have poor depth perception and unable to locate the chair to R. Patient presented with two episodes of significant shaking of entire body, left lean in sitting and eye rolling. Both episodes after standing but in sitting position. Second episode with stiffness of posture. RN was called and RR was called by RN.   Team present at the end of session.    earlier in the session.  At the end of session 168 after second episode.   Regarding L le pain, patient performed AAROM in full range and able to wb on LLE without any pain.     THERAPEUTIC EXERCISES  Lower Extremity Ankle pumps  Hip AB/AD  Heel slides  LAQ  SAQ  SLR     Upper Extremity Shoulder abd/add and Shoulder flex/ext     Position Sitting and Supine     Repetitions   10   Sets   1     Patient End of Session: In bed;Needs met;Call light within reach;RN aware of session/findings;All patient questions and concerns addressed;With  staff    PT Session Time: 38 minutes  Gait Training: 3 minutes  Therapeutic Activity: 15 minutes  Therapeutic Exercise: 10 minutes   Neuromuscular Re-education: 10 minutes

## 2025-02-27 NOTE — PROGRESS NOTES
A/Ox2, RA, SL, bed bound, cardiac diet.  Contact/droplet isolation in place.  Restraints DCd this morning, camera monitoring in place at this time.  Pt remains confused and calm in bed.  No complaints of pain or nausea. Incontinent x2, primofit and brief in place.  Appropriate safety precautions in place, questions and concerns addressed.     2 person skin check with CAROLINA Nina.  Scattered bruising to BLUE, redness noted to bilateral knees, and redness noted to sacrum/anus - new mepilex applied.  Otherwise skin is clean and intact.     0300: Pt removed IV access.  Hospitalist paged and gave the OK to leave IV out and modify prn hydralazine order to PO.

## 2025-02-27 NOTE — PROGRESS NOTES
[facility] Hospitalist Progress Note    Subjective:  Given seroquel last night. Restraints removed. Rapid called this morning.     Review of Systems  Comprehensive ROS reviewed and negative except for what is stated in HPI.      OBJECTIVE:  /57 (BP Location: Left arm)   Pulse 70   Temp 97.7 °F (36.5 °C)   Resp 18   Ht 5' 7\" (1.702 m)   Wt 145 lb (65.8 kg)   SpO2 99%   BMI 22.71 kg/m²   General: No apparent distress.  AO x1.   HEENT:  EOMI, PERRLA. Normal oral mucosa. No lacerations.   Pulm: Decreased breath sounds. Normal respiratory effort.  CV: Regular rate and rhythm, no murmur.   Abd: Soft, nontender, nondistended.   MSK: No obvious deformities.    Skin: No lesions or rashes.  Neuro: No obvious focal deficits. Following simple commands.     LABS:   Lab Results   Component Value Date    PGLU 117 02/27/2025       All lab work and imaging personally reviewed.    Assessment/ Plan:     A/P: 89 y/o M w/ PMHx of HLD, Dementia who presents to the hospital after multiple falls in the past 1-2 weeks    Encephalopathy vs concern for seizure activity  - rapid outline as below  - STAT CT head  - aspiration and seizure precautions  - neurology on consult      Recurrent Falls  Possible contribution of 2 viral infections  Hx of Dementia/Cognitive Decline  Plan:  - PT/OT evaluations recommending OMAR, SW to assist w/ placement     Hospital Delirium  Hx of Dementia  Plan:  - Seroquel nightly  - Posey PRN  - Melatonin nightly     Influenza A Infection  COVID19  Plan:  - Continue Tamiflu  - No advanced COVID therapies needed     HTN  - Verapamil     HLD  - Statin    DVT ppx: heparin subcu  Diet: NPO  Disposition: OMAR?   Code status: DNR / comfort    Rapid  response 2/27:   RRT called for seizure like activity described as convulsions after working with therapy this morning. Patients daughter at bedside. States he is now per baseline (confused and AO x1-2 at best). He is being treated for influenza currently. On my arrival, he  denies acute complaints otherwise. Does not appear to be postictal. No apparent deficits. Blood sugar wnl, vitals with sbp in 160 range. Held Seroquel, no other sedative meds administered per MAR. STAT CT head, aspiration precautions, NPO for now. Will consult neurology.     D/w daughter at bedside and RN.   Critical care time: 32 mins      Outpatient records or previous hospital records reviewed.   [facility] hospitalist to continue to follow patient while in house.    Stevenson Lane DO  [facility] Hospitalist

## 2025-02-28 VITALS
SYSTOLIC BLOOD PRESSURE: 127 MMHG | OXYGEN SATURATION: 99 % | TEMPERATURE: 98 F | DIASTOLIC BLOOD PRESSURE: 88 MMHG | HEIGHT: 67 IN | RESPIRATION RATE: 18 BRPM | HEART RATE: 77 BPM | WEIGHT: 145 LBS | BODY MASS INDEX: 22.76 KG/M2

## 2025-02-28 LAB
ANION GAP SERPL CALC-SCNC: 9 MMOL/L (ref 0–18)
BUN BLD-MCNC: 20 MG/DL (ref 9–23)
CALCIUM BLD-MCNC: 9 MG/DL (ref 8.7–10.6)
CHLORIDE SERPL-SCNC: 105 MMOL/L (ref 98–112)
CO2 SERPL-SCNC: 22 MMOL/L (ref 21–32)
CREAT BLD-MCNC: 1.09 MG/DL
EGFRCR SERPLBLD CKD-EPI 2021: 62 ML/MIN/1.73M2 (ref 60–?)
ERYTHROCYTE [DISTWIDTH] IN BLOOD BY AUTOMATED COUNT: 12.1 %
GLUCOSE BLD-MCNC: 103 MG/DL (ref 70–99)
HCT VFR BLD AUTO: 41.3 %
HGB BLD-MCNC: 13.7 G/DL
MCH RBC QN AUTO: 29.8 PG (ref 26–34)
MCHC RBC AUTO-ENTMCNC: 33.2 G/DL (ref 31–37)
MCV RBC AUTO: 89.8 FL
OSMOLALITY SERPL CALC.SUM OF ELEC: 285 MOSM/KG (ref 275–295)
PLATELET # BLD AUTO: 148 10(3)UL (ref 150–450)
POTASSIUM SERPL-SCNC: 5.6 MMOL/L (ref 3.5–5.1)
RBC # BLD AUTO: 4.6 X10(6)UL
SODIUM SERPL-SCNC: 136 MMOL/L (ref 136–145)
WBC # BLD AUTO: 7.5 X10(3) UL (ref 4–11)

## 2025-02-28 PROCEDURE — 99232 SBSQ HOSP IP/OBS MODERATE 35: CPT

## 2025-02-28 RX ORDER — VERAPAMIL HYDROCHLORIDE 120 MG/1
120 TABLET, FILM COATED, EXTENDED RELEASE ORAL DAILY
Qty: 30 TABLET | Refills: 0 | Status: SHIPPED | OUTPATIENT
Start: 2025-03-01 | End: 2025-03-31

## 2025-02-28 RX ORDER — PAROXETINE 20 MG/1
20 TABLET, FILM COATED ORAL DAILY
Qty: 30 TABLET | Refills: 0 | Status: SHIPPED | OUTPATIENT
Start: 2025-03-01 | End: 2025-03-31

## 2025-02-28 NOTE — CM/SW NOTE
Updates sent to [facility] Brit[facility]- await clearance to richelle.    JOHN MahoenyW  /Discharge Planner

## 2025-02-28 NOTE — PROCEDURES
EEG REPORT;    Reason for Examination: [facility]    Technical Summary:   18 Channels of EEG and 1 Channel of EKG was performed utilizing internation 10/20 method.        Background Activity:   The background activity consisted of 9 Hz waveforms, reactive to eye opening/ external stimulation.    Abnormality:  Throughout the recording low to medium voltage, polymorphic, 3 to 6 Hz slow activity was noted diffusely over both hemispheres      Activation:    Hyperventilation:   Not Performed.    Photic Stimulation:  Driving response seen.No       Sleep:  Stage I sleep seen.   Stage I and II sleep seen.    Impression:  This is an Abnormal EEG. No focal, lateralized or generalized epileptiform activity seen.   Mild diffuse slowing into delta and theta range was noted.  This constellation of findings can be seen in encephalopathy due to metabolic/toxic etiology, medication effects or diffuse cerebral injury.  Clinical correlation is recommended.        Vinayak No MD  [facility].

## 2025-02-28 NOTE — DISCHARGE SUMMARY
[facility] Hospitalist Discharge Summary     Patient ID:  Merrick Stephenson  89 year old  10/24/1928    Admit date: 2/23/2025  Discharge date and time: 2/28/25  Attending Physician: Stevenson Lane DO   Primary Care Physician: Nelly Miranda MD   Discharge Diagnoses: Flu [J11.1]  Fall, initial encounter [W19.XXXA]  COVID-19 [U07.1]    Discharge Condition: Stable    Disposition:  [facility]     Follow up:   -PCP as advised     Hospital Course:  89 y/o M w/ PMHx of HLD, Dementia who presents to the hospital after multiple falls in the past 1-2 weeks. Denies antecedent dizziness/lightheadedness, chest pain, sob, n/v/d, sick contacts, fevers, dysuria, increased frequency. States he has just been unsteady progressively as he is getting older. History is limited as patient is mildly confused.  ED Vitals: HTN  Labs:  Near baseline Cr  HgB 11.9  Plts 126     CTH: negative     Patient was admitted due to safety concerns at home.     Encephalopathy, likely metabolic  - appears multifactorial in setting of infectious and baseline dementia.   - CTH head negative, EEG with findings suggestive of metabolic encephalopathy.  - evaluated by neurology, no indication for antiepileptics   - seizure precautions     Recurrent Falls  Possible contribution of 2 viral infections  Hx of Dementia/Cognitive Decline  Plan:  - PT/OT , plan for [facility] Delirium  Hx of Dementia  Plan:  - Melatonin nightly     Influenza A Infection  COVID19  Plan:  - Continue Tamiflu  - No advanced COVID therapies needed     HTN  - Verapamil     HLD  - Statin    Dispo: remains on RA, no further resp concerns. Cleared by neurology (CTH and EEG without acute findings, mostly c/w metabolic encephalopathy). D/w patients daughter and bedside RN. Cleared for dc today has been restraint free for 24h.     Exam on Day of DC:  /88 (BP Location: Left arm)   Pulse 77   Temp 97.8 °F (36.6 °C) (Oral)   Resp 18   Ht 5' 7\" (1.702 m)   Wt 145 lb (65.8 kg)   SpO2 99%   BMI  22.71 kg/m²   General: No apparent distress.  AO x1.   HEENT:  EOMI, PERRLA. Normal oral mucosa.   Pulm: Decreased breath sounds. Normal respiratory effort.  CV: Regular rate and rhythm, no murmur.   Abd: Soft, nontender, nondistended.   MSK: No obvious deformities.    Skin: No lesions or rashes.  Neuro: No obvious focal deficits. Following simple commands.     I as the attending physician reconciled the current and discharge medications on day of discharge.     Current Discharge Medication List        START taking these medications    Details   PARoxetine 20 MG Oral Tab Take 1 tablet (20 mg total) by mouth daily.      verapamil  MG Oral Tab CR Take 1 tablet (120 mg total) by mouth daily.           CONTINUE these medications which have NOT CHANGED    Details   donepezil 10 MG Oral Tab Take 1 tablet (10 mg total) by mouth nightly.      atorvastatin 40 MG Oral Tab Take 1 tablet (40 mg total) by mouth daily.      PREVIDENT 5000 DRY MOUTH 1.1 % Dental Gel       Fluticasone Propionate 50 MCG/ACT Nasal Suspension 2 sprays by Each Nare route daily. During allergy season. Drink water after use.      latanoprost (XALATAN) 0.005 % Ophthalmic Solution Apply 1 drop to eye daily.             Activity: activity as tolerated  Diet: regular diet  Wound Care: as directed  Code Status: DNAR/Comfort Care    Total time coordinating care for discharge: Greater than 34 minutes    DO Johnie Tovar [facility] Hospitalist

## 2025-02-28 NOTE — PROGRESS NOTES
Alert and Oriented x 1-2  Confused at times  Patient keeping eyes closed.  Opens eyes when instructed to open.  Also needing reminders to swallow when eating or drinking.    Denies pain or SOB.  VSS  Afebrile O2 sat WNL on Room air.  Denies nausea or emesis.  Tolerated  Cardiac diet with 1:1 feeding.  Frequent reminder to swallow when feeding.   Lokelma given per orders.  Resting calmly in bed.  Patient rolling to side in bed.  Voiding in adequate amounts with primofit in place.   Patient had incontinent large loose stool.  Patient   Cleaned and new brief and Mepilex placed.

## 2025-02-28 NOTE — PROGRESS NOTES
[facility]  [place] Neurology Progress Note    Merrick Stephenson Patient Status:  Inpatient    10/24/1928 MRN [id number]   Location [facility] 3NW-A Attending Stevenson Lane, DO   Hosp Day # 4 PCP Nelly Miranda MD     CC: seizure like episode    Subjective: Patient seen for a follow up visit today, resting in bed and reports feeling fine, denies any pain, vision changes. No focal neurological deficit noted. No acute events over night.        MEDICATIONS:  No current outpatient medications on file.     Current Facility-Administered Medications   Medication Dose Route Frequency    hydrALAZINE (Apresoline) tab 10 mg  10 mg Oral Q6H PRN    acetaminophen (Tylenol Extra Strength) tab 500 mg  500 mg Oral Q4H PRN    atorvastatin (Lipitor) tab 40 mg  40 mg Oral Daily    donepezil (Aricept) tab 10 mg  10 mg Oral Nightly    latanoprost (Xalatan) 0.005 % ophthalmic solution 1 drop  1 drop Ophthalmic Daily    heparin (Porcine) 5000 UNIT/ML injection 5,000 Units  5,000 Units Subcutaneous Q8H JUANY    verapamil ER (Calan-SR) tab 120 mg  120 mg Oral Daily    PARoxetine (Paxil) tab 20 mg  20 mg Oral Daily    [Held by provider] QUEtiapine (SEROquel) tab 25 mg  25 mg Oral Nightly    melatonin tab 3 mg  3 mg Oral Nightly       REVIEW OF SYSTEMS:  A 10-point system was reviewed.  Pertinent positives and negatives are noted in HPI.      PHYSICAL EXAMINATION:  VITAL SIGNS: BP (!) 184/80 (BP Location: Left arm)   Pulse 71   Temp 97.9 °F (36.6 °C) (Oral)   Resp 16   Ht 67\"   Wt 145 lb (65.8 kg)   SpO2 96%   BMI 22.71 kg/m²   GENERAL:  Patient is a 89 year old male in no acute distress.  HEENT:  Normocephalic, atraumatic  ABD: Soft, non tender  SKIN: Warm, dry, no rashes    NEUROLOGICAL:   Mental status: Oriented to person, place, not situation or time. Follows some simple commands  Speech: Fluent, no obvious aphasia or dysarthria.   Memory and comprehension: pleasantly confused at times  Cranial Nerves:  PERRL, facial  sensation intact, face symmetric, tongue midline, shoulder shrug equal  Motor: GRAVES x 4, refuses to follow commands  Sensory: Intact to light touch bilaterally  Coordination:unable to assess, refused to perform exam  Gait: Deferred      Imaging/Diagnostics:  CT BRAIN OR HEAD (CPT=70450)    Result Date: 2/27/2025  PROCEDURE:  CT BRAIN OR HEAD (71269)  COMPARISON:  [facility] , CT, CT BRAIN OR HEAD (24019), 2/23/2025, 1:55 AM.  INDICATIONS:  concern for seizure  TECHNIQUE:  Noncontrast CT scanning is performed through the brain. Dose reduction techniques were used. Dose information is transmitted to the ACR (American College of Radiology) NRDR (National Radiology Data Registry) which includes the Dose Index Registry.  PATIENT STATED HISTORY: (As transcribed by Technologist)  Patient altered mental status, possible seizure.    FINDINGS: No evidence of intracranial hemorrhage or extra-axial fluid collection. Lucencies in the deep periventricular white matter are likely sequelae of chronic small vessel ischemic disease. Prominence of the sulci is noted. No mass effect. Mild to moderate mucoperiosteal thickening of the paranasal sinuses.  The Visualized portions of the mastoid air cells are unremarkable. Visualized portions of the orbits are unremarkable. IMPRESSION: Global parenchymal volume loss is noted.  Sequelae of chronic small vessel ischemic disease is noted. No evidence of intracranial hemorrhage or extra-axial fluid collection.    LOCATION:  [facility]   Dictated by (CST): Júnior Newell MD on 2/27/2025 at 1:53 PM     Finalized by (CST): Júnior Newell MD on 2/27/2025 at 1:57 PM       CT BRAIN OR HEAD (CPT=70450)    Result Date: 2/23/2025  CONCLUSION:  No acute intracranial abnormality identified.  Mild age-indeterminate microvascular ischemic changes in the cerebral white matter. If there is clinical concern for acute ischemia/infarction, an MRI of the brain would be recommended for further evaluation.  A  preliminary report was provided by the [facility] teleradiology service.   LOCATION:  [facility]   Dictated by (CST): Bethel Rodriguez MD on 2/23/2025 at 7:22 AM     Finalized by (CST): Bethel Rodriguez MD on 2/23/2025 at 7:23 AM          Labs:  Recent Labs   Lab 02/23/25  0036 02/24/25  0510 02/26/25  0817 02/28/25  0722   RBC 3.92 3.96 3.86 4.60   HGB 11.9* 12.1* 11.7* 13.7   HCT 35.4* 38.5* 34.9* 41.3   MCV 90.3 97.2 90.4 89.8   MCH 30.4 30.6 30.3 29.8   MCHC 33.6 31.4 33.5 33.2   RDW 12.7 12.5 12.4 12.1   NEPRELIM 3.20  --   --   --    WBC 5.9 4.6 5.0 7.5   .0* 129.0* 109.0* 148.0*         Recent Labs   Lab 02/24/25  0510 02/26/25  0817 02/28/25  0519   GLU 80 92 103*   BUN 17 27* 20   CREATSERUM 1.06 1.10 1.09   EGFRCR 64 61 62   CA 9.0 9.1 9.0    139 136   K 4.7 3.9 5.6*    107 105   CO2 22.0 28.0 22.0     EEG REPORT    Impression:  This is an Abnormal EEG. No focal, lateralized or generalized epileptiform activity seen. Mild diffuse slowing into delta and theta range was noted.  This constellation of findings can be seen in encephalopathy due to metabolic/toxic etiology, medication effects or diffuse cerebral injury.  Clinical correlation is recommended.        Pre-morbid mRS 1      Assessment and Plan:    A 89 year old female with dementia and recent influenza and COVID co-infection who likely experienced orthostasis and possibly convulsive syncope, less likely epilepsy.    - Per nursing report, orthostatics unable to assess as pt bed bound   - EEG routine reported mild diffuse slowing, no seizure activity reported.   - No indication for antiepileptics at this point.    - Continue supportive care   - Fall/safety precautions    Plan of care discussed with pt. No further inpatient neuro work up at this time. Case discussed with Dr. Aguilar.    Is this a shared or split note between Advanced Practice Provider and Physician? Yes     KAMAR Escobar  [facility]  2/28/2025, 9:32  AM  [place] # [id number]

## 2025-02-28 NOTE — PLAN OF CARE
NEURO: A&Ox1-2, confused at times.  VSS. RA. . Denies chest pain and SOB  GI: Abdomen soft, nondistended. Pt reports passing gas and belching. Denies N/V  : Voids via primofit with adequate clear output  PAIN: Pt denies pain, no pain med given at this time.  AMB: Bed bound, rolls from side to side.   DIET: Cardiac diet. 1:1 feeder    Pt on isolation for covid and flu positive. All appropriate safety measures put in place. Pt updated on POC, call light within reach.

## 2025-02-28 NOTE — CM/SW NOTE
02/28/25 1046   Discharge disposition   Expected discharge disposition subacute   Post Acute Care Provider Gaurav Nolen   Discharge transportation [facility]     Pt cleared to dc to G[facility]av Brito.  [facility] arranged for 2pm dc.  RN  to call report to [phone].  Message left for daughter.    Julienne Feldman LCSW  /Discharge Planner

## 2025-02-28 NOTE — PROGRESS NOTES
NURSING DISCHARGE NOTE    Discharged  to [facility] via Ambulance  Accompanied by Support staff  Belongings Taken by patient/family  Alert and Oriented x 1-2.  .VSS  Afebrile  O2 sat WNL on room air.  Denies pain.  Denies nausea or emesis.  Tolerated breakfast and appetite good.  Refused to eat lunch stating he was not hungry. IV previously removed.  Discharge instructions and transfer record given to Ambulance staff to give to [facility] staff.  Report called to [facility] nurse taking patient.  Patient transferred to [facility]  per Ambulance.

## (undated) DEVICE — SOL  .9 1000ML BTL

## (undated) DEVICE — TRANSPOSAL ULTRAFLEX DUO/QUAD ULTRA CART MANIFOLD

## (undated) DEVICE — 3M™ STERI-STRIP™ REINFORCED ADHESIVE SKIN CLOSURES, R1547, 1/2 IN X 4 IN (12 MM X 100 MM), 6 STRIPS/ENVELOPE: Brand: 3M™ STERI-STRIP™

## (undated) DEVICE — 3M™ TEGADERM™ TRANSPARENT FILM DRESSING, 1626W, 4 IN X 4-3/4 IN (10 CM X 12 CM), 50 EACH/CARTON, 4 CARTON/CASE: Brand: 3M™ TEGADERM™

## (undated) DEVICE — CHLORAPREP 26ML APPLICATOR

## (undated) DEVICE — CV PACK-LF: Brand: MEDLINE INDUSTRIES, INC.

## (undated) DEVICE — 3M™ IOBAN™ 2 ANTIMICROBIAL INCISE DRAPE 6651EZ: Brand: IOBAN™ 2

## (undated) DEVICE — GAUZE SPONGES,12 PLY: Brand: CURITY

## (undated) DEVICE — STANDARD HYPODERMIC NEEDLE,POLYPROPYLENE HUB: Brand: MONOJECT

## (undated) DEVICE — GOWN SURG AERO CHROME XXL

## (undated) DEVICE — Device

## (undated) DEVICE — STERILE POLYISOPRENE POWDER-FREE SURGICAL GLOVES: Brand: PROTEXIS

## (undated) NOTE — LETTER
[address] [facility] FOR BLOOD OR BLOOD COMPONENTS      In the course of your treatment, it may become necessary to administer a transfusion of blood or blood components.  This form provides basic information concerning this proc explain the alternatives to you if it has not already been done. I,Merrick Stephenson, have read/had read to me the above. I understand the matters bearing on the decision whether or not to authorize a transfusion of blood or blood components.  I have no que

## (undated) NOTE — MR AVS SNAPSHOT
[address] [address] [phone]               Thank you for choosing us for your health care visit with Tip Blanc DO.   We are glad to serve you and happy to provide you with th • Previous elevated impaired FBS or GTT   … or any two of the following:   • Overweight (BMI ³25 but <30)   • Family history of diabetes   • Age 72 years or older   • History of gestational diabetes or birth of baby weighing more than 9 pounds     Covered  Americans over age 72 Got new glasses had cataract surgery  Distance /reading  OK to schedule if you are in this risk group, make sure you have a referral   Prostate Cancer Screening      PSA  Annually to 75 then as needed or GUS annually to 75 PS A link to the [employer]. This site has a lot of good information including definitions of the different types of Advance Directives.  It also has the State forms available on it's website for anyone to review and print using their home Visits < Visit Summaries. MyChart questions? Call [phone] for help. [employer] is NOT to be used for urgent needs. For medical emergencies, dial 911.         Educational Information     TOP FALL PREVENTION TIPS    INSIDE YOUR HOME   KITCHEN:  ? Use

## (undated) NOTE — LETTER
Lorin Franco [address] [address]  [place], [address] [place] [address]    Consent for Operation  Date: __________________                                Time: _______________    1.  I authorize the performance upon Silvia Mantle the following operation:  Procedur procedure has been videotaped, the surgeon will obtain the original videotape. The hospital will not be responsible for storage or maintenance of this tape.   7. For the purpose of advancing medical education, I consent to the admittance of observers to the STATEMENTS REQUIRING INSERTION OR COMPLETION WERE FILLED IN.     Signature of Patient:   ___________________________    When the patient is a minor or mentally incompetent to give consent:  Signature of person authorized to consent for patient: ____________ supplements, and pills I can buy without a prescription (including street drugs/illegal medications). Failure to inform my anesthesiologist about these medicines may increase my risk of anesthetic complications. iv.  If I am allergic to anything or have ha Anesthesiologist Signature     Date   Time  I have discussed the procedure and information above with the patient (or patient’s representative) and answered their questions. The patient or their representative has agreed to have anesthesia services.     ___